# Patient Record
Sex: FEMALE | Race: BLACK OR AFRICAN AMERICAN | Employment: FULL TIME | URBAN - METROPOLITAN AREA
[De-identification: names, ages, dates, MRNs, and addresses within clinical notes are randomized per-mention and may not be internally consistent; named-entity substitution may affect disease eponyms.]

---

## 2018-12-30 ENCOUNTER — HOSPITAL ENCOUNTER (EMERGENCY)
Age: 28
Discharge: HOME | End: 2018-12-30
Attending: EMERGENCY MEDICINE
Payer: COMMERCIAL

## 2018-12-30 VITALS
HEART RATE: 76 BPM | TEMPERATURE: 98.4 F | RESPIRATION RATE: 18 BRPM | WEIGHT: 127 LBS | HEIGHT: 62 IN | SYSTOLIC BLOOD PRESSURE: 110 MMHG | DIASTOLIC BLOOD PRESSURE: 72 MMHG | BODY MASS INDEX: 23.37 KG/M2 | OXYGEN SATURATION: 99 %

## 2018-12-30 DIAGNOSIS — R10.13 ABDOMINAL PAIN, EPIGASTRIC: Primary | ICD-10-CM

## 2018-12-30 LAB
ANION GAP SERPL CALC-SCNC: 10 MMOL/L
BACTERIA URNS QL MICRO: ABNORMAL /HPF
BASOPHILS # BLD: 0 K/UL (ref 0–0.2)
BASOPHILS NFR BLD: 1 % (ref 0–2)
BUN SERPL-MCNC: 6 MG/DL (ref 6–23)
CALCIUM SERPL-MCNC: 8.4 MG/DL (ref 8.3–10.4)
CASTS URNS QL MICRO: ABNORMAL /LPF
CHLORIDE SERPL-SCNC: 108 MMOL/L (ref 98–107)
CO2 SERPL-SCNC: 22 MMOL/L (ref 21–32)
CREAT SERPL-MCNC: 0.58 MG/DL (ref 0.6–1)
DIFFERENTIAL METHOD BLD: ABNORMAL
EOSINOPHIL # BLD: 0.2 K/UL (ref 0–0.8)
EOSINOPHIL NFR BLD: 4 % (ref 0.5–7.8)
EPI CELLS #/AREA URNS HPF: ABNORMAL /HPF
ERYTHROCYTE [DISTWIDTH] IN BLOOD BY AUTOMATED COUNT: 13.2 % (ref 11.9–14.6)
GLUCOSE SERPL-MCNC: 81 MG/DL (ref 65–100)
HCG SERPL-ACNC: 25 MIU/ML (ref 0–6)
HCG UR QL: NEGATIVE
HCT VFR BLD AUTO: 36.2 % (ref 35.8–46.3)
HGB BLD-MCNC: 11.9 G/DL (ref 11.7–15.4)
IMM GRANULOCYTES # BLD: 0 K/UL (ref 0–0.5)
IMM GRANULOCYTES NFR BLD AUTO: 0 % (ref 0–5)
LYMPHOCYTES # BLD: 2.2 K/UL (ref 0.5–4.6)
LYMPHOCYTES NFR BLD: 41 % (ref 13–44)
MCH RBC QN AUTO: 26 PG (ref 26.1–32.9)
MCHC RBC AUTO-ENTMCNC: 32.9 G/DL (ref 31.4–35)
MCV RBC AUTO: 79.2 FL (ref 79.6–97.8)
MONOCYTES # BLD: 0.4 K/UL (ref 0.1–1.3)
MONOCYTES NFR BLD: 8 % (ref 4–12)
NEUTS SEG # BLD: 2.4 K/UL (ref 1.7–8.2)
NEUTS SEG NFR BLD: 46 % (ref 43–78)
NRBC # BLD: 0 K/UL (ref 0–0.2)
PLATELET # BLD AUTO: 332 K/UL (ref 150–450)
PMV BLD AUTO: 8.8 FL (ref 9.4–12.3)
POTASSIUM SERPL-SCNC: 3.4 MMOL/L (ref 3.5–5.1)
RBC # BLD AUTO: 4.57 M/UL (ref 4.05–5.2)
RBC #/AREA URNS HPF: ABNORMAL /HPF
SODIUM SERPL-SCNC: 140 MMOL/L (ref 136–145)
WBC # BLD AUTO: 5.3 K/UL (ref 4.3–11.1)
WBC URNS QL MICRO: ABNORMAL /HPF

## 2018-12-30 PROCEDURE — 96375 TX/PRO/DX INJ NEW DRUG ADDON: CPT | Performed by: EMERGENCY MEDICINE

## 2018-12-30 PROCEDURE — 81015 MICROSCOPIC EXAM OF URINE: CPT

## 2018-12-30 PROCEDURE — 80048 BASIC METABOLIC PNL TOTAL CA: CPT

## 2018-12-30 PROCEDURE — 96374 THER/PROPH/DIAG INJ IV PUSH: CPT | Performed by: EMERGENCY MEDICINE

## 2018-12-30 PROCEDURE — 81003 URINALYSIS AUTO W/O SCOPE: CPT | Performed by: EMERGENCY MEDICINE

## 2018-12-30 PROCEDURE — 99284 EMERGENCY DEPT VISIT MOD MDM: CPT | Performed by: EMERGENCY MEDICINE

## 2018-12-30 PROCEDURE — 85025 COMPLETE CBC W/AUTO DIFF WBC: CPT

## 2018-12-30 PROCEDURE — 74011250636 HC RX REV CODE- 250/636: Performed by: EMERGENCY MEDICINE

## 2018-12-30 PROCEDURE — 81025 URINE PREGNANCY TEST: CPT

## 2018-12-30 PROCEDURE — 84702 CHORIONIC GONADOTROPIN TEST: CPT

## 2018-12-30 RX ORDER — KETOROLAC TROMETHAMINE 30 MG/ML
30 INJECTION, SOLUTION INTRAMUSCULAR; INTRAVENOUS
Status: COMPLETED | OUTPATIENT
Start: 2018-12-30 | End: 2018-12-30

## 2018-12-30 RX ORDER — FAMOTIDINE 10 MG/ML
20 INJECTION INTRAVENOUS
Status: COMPLETED | OUTPATIENT
Start: 2018-12-30 | End: 2018-12-30

## 2018-12-30 RX ADMIN — FAMOTIDINE 20 MG: 10 INJECTION, SOLUTION INTRAVENOUS at 03:28

## 2018-12-30 RX ADMIN — KETOROLAC TROMETHAMINE 30 MG: 30 INJECTION, SOLUTION INTRAMUSCULAR at 03:28

## 2018-12-30 NOTE — ED TRIAGE NOTES
Pt states that she had an ectopic pregnancy 2 weeks ago and was given methotrexate. Continues to have right lower abd pain and vaginal bleeding

## 2018-12-30 NOTE — ED PROVIDER NOTES
81-year-old female presented for epigastric cramping. Symptoms are just prior to arrival.  It got much better now. She thinks it may be related to having a few drinks tonight when she was out. She is concerned that because she had an ectopic pregnancy treated with methotrexate. She's been getting weekly blood test to make sure her hCG is dropping. most recent was 225. She reports some nausea but denies vomiting The history is provided by the patient. Abdominal Pain This is a new problem. The current episode started 1 to 2 hours ago. The problem occurs constantly. The problem has been gradually improving. The pain is associated with ETOH use. The pain is located in the epigastric region. The quality of the pain is aching and cramping. The pain is at a severity of 4/10. The pain is moderate. Associated symptoms include nausea. Pertinent negatives include no anorexia, no fever, no belching, no diarrhea, no flatus, no hematochezia, no melena, no vomiting, no constipation, no dysuria, no frequency, no hematuria, no headaches, no arthralgias, no myalgias, no trauma, no chest pain, no testicular pain and no back pain. No past medical history on file. Past Surgical History:  
Procedure Laterality Date  HX CHOLECYSTECTOMY Family History:  
Problem Relation Age of Onset  Breast Cancer Mother   
     s/p breast reduction- incidental finding, unsure details, mom is secretive  Breast Cancer Other 23 P 1/2 sister in the [facility], Bilateral mastectomy, chemo and radiation-unsure if she had BRACA testing.  Stomach Cancer Other ? PGM  
 Ovarian Cancer Neg Hx  Colon Cancer Neg Hx  Prostate Cancer Neg Hx  Deep Vein Thrombosis Neg Hx  Pulmonary Embolism Neg Hx Social History Socioeconomic History  Marital status: SINGLE Spouse name: Not on file  Number of children: Not on file  Years of education: Not on file  Highest education level: Not on file Social Needs  Financial resource strain: Not on file  Food insecurity - worry: Not on file  Food insecurity - inability: Not on file  Transportation needs - medical: Not on file  Transportation needs - non-medical: Not on file Occupational History  Not on file Tobacco Use  Smoking status: Never Smoker  Smokeless tobacco: Never Used Substance and Sexual Activity  Alcohol use: No  
 Drug use: No  
 Sexual activity: Yes  
  Partners: Male Birth control/protection: Condom Other Topics Concern  Not on file Social History Narrative 1. Sister is Bettina Mccollum ALLERGIES: Bactrim [sulfamethoprim ds] and Sulfa (sulfonamide antibiotics) Review of Systems Constitutional: Negative for fever. Cardiovascular: Negative for chest pain. Gastrointestinal: Positive for abdominal pain and nausea. Negative for anorexia, constipation, diarrhea, flatus, hematochezia, melena and vomiting. Genitourinary: Negative for dysuria, frequency, hematuria and testicular pain. Musculoskeletal: Negative for arthralgias, back pain and myalgias. Neurological: Negative for headaches. All other systems reviewed and are negative. Vitals:  
 12/30/18 0148 BP: 106/68 Pulse: 86 Resp: 16 Temp: 98.2 °F (36.8 °C) SpO2: 98% Weight: 57.6 kg (127 lb) Height: 5' 2\" (1.575 m) Physical Exam  
Constitutional: She is oriented to person, place, and time. She appears well-developed and well-nourished. HENT:  
Head: Normocephalic and atraumatic. Eyes: Conjunctivae are normal. Pupils are equal, round, and reactive to light. Neck: Neck supple. Cardiovascular: Normal rate and regular rhythm. Pulmonary/Chest: Effort normal and breath sounds normal.  
Abdominal: Soft. Bowel sounds are normal.  
Musculoskeletal: Normal range of motion. Neurological: She is alert and oriented to person, place, and time. Skin: Skin is warm and dry. Nursing note and vitals reviewed. MDM Number of Diagnoses or Management Options Abdominal pain, epigastric:  
Diagnosis management comments: 81-year-old female presenting for epigastric pain. Concern for constipation, peptic culture disease, pancreatitis, cholelithiasis. Patient did recently have methotrexate treatment for an ectopic pregnancy but her pain is more in the epigastric area one would think pain related spelled B in the lower abdomen. We'll check basic labs, hCG and treat with Pepcid and Toradol. Amount and/or Complexity of Data Reviewed Clinical lab tests: ordered and reviewed Tests in the radiology section of CPT®: ordered and reviewed Tests in the medicine section of CPT®: ordered and reviewed Risk of Complications, Morbidity, and/or Mortality Presenting problems: moderate Diagnostic procedures: moderate Management options: moderate General comments: Patient is feeling much better. Abdominal exam is benign. Vital signs are normal.  Patient's hCG is dropping so do not think this has any relationship to her recent ectopic pregnancy. Patient's symptoms seemed to have resolved. Discharging home with instructions to return should her symptoms change or worsen Patient Progress Patient progress: improved Procedures

## 2018-12-30 NOTE — ED NOTES
Found pt in waiting room eating goldfish crackers.  Informed pt not to eat or drink anything else until approved by the MD.

## 2018-12-30 NOTE — ED NOTES
I have reviewed discharge instructions with the patient. The patient verbalized understanding. Patient left ED via Discharge Method: ambulatory to Home with (friend). Opportunity for questions and clarification provided. Patient given 0 scripts. To continue your aftercare when you leave the hospital, you may receive an automated call from our care team to check in on how you are doing. This is a free service and part of our promise to provide the best care and service to meet your aftercare needs.  If you have questions, or wish to unsubscribe from this service please call [phone]. Thank you for Choosing our [facility] Emergency Department.

## 2018-12-30 NOTE — DISCHARGE INSTRUCTIONS
Please return the emergency Department if he develop worsening pain, fevers, or nearly passed out.   Otherwise please follow up with your doctors next week or 2 for reevaluation

## 2021-08-31 PROBLEM — R42 DIZZINESS: Status: ACTIVE | Noted: 2021-08-31

## 2021-08-31 PROBLEM — M79.645 PAIN OF LEFT THUMB: Status: ACTIVE | Noted: 2021-08-31

## 2022-02-08 PROBLEM — Z34.90 SUPERVISION OF NORMAL PREGNANCY: Status: ACTIVE | Noted: 2022-02-08

## 2022-03-18 PROBLEM — Z34.90 SUPERVISION OF NORMAL PREGNANCY: Status: ACTIVE | Noted: 2022-02-08

## 2022-03-19 PROBLEM — R42 DIZZINESS: Status: ACTIVE | Noted: 2021-08-31

## 2022-03-19 PROBLEM — M79.645 PAIN OF LEFT THUMB: Status: ACTIVE | Noted: 2021-08-31

## 2022-05-19 VITALS — DIASTOLIC BLOOD PRESSURE: 60 MMHG | WEIGHT: 159 LBS | BODY MASS INDEX: 29.08 KG/M2 | SYSTOLIC BLOOD PRESSURE: 102 MMHG

## 2022-06-09 ENCOUNTER — ROUTINE PRENATAL (OUTPATIENT)
Dept: OBGYN CLINIC | Age: 32
End: 2022-06-09
Payer: MEDICAID

## 2022-06-09 VITALS — BODY MASS INDEX: 29.81 KG/M2 | SYSTOLIC BLOOD PRESSURE: 102 MMHG | WEIGHT: 163 LBS | DIASTOLIC BLOOD PRESSURE: 60 MMHG

## 2022-06-09 DIAGNOSIS — Z13.0 SCREENING, ANEMIA, DEFICIENCY, IRON: ICD-10-CM

## 2022-06-09 DIAGNOSIS — Z34.83 ENCOUNTER FOR SUPERVISION OF OTHER NORMAL PREGNANCY IN THIRD TRIMESTER: Primary | ICD-10-CM

## 2022-06-09 DIAGNOSIS — Z13.1 SCREENING FOR DIABETES MELLITUS: ICD-10-CM

## 2022-06-09 PROCEDURE — 99213 OFFICE O/P EST LOW 20 MIN: CPT | Performed by: OBSTETRICS & GYNECOLOGY

## 2022-06-09 NOTE — PROGRESS NOTES
26yo  at 29w3d for CHEL s/p 1hr GTT.    +FM, no VB or LOF    RC discussed. Greater than 15 minutes spent on face to face counseling, education, and examining the patient regarding her exam findings, indications for further tests, and discussion of routine prenatal counseling and COVID updates, recommendation for TDap vaccination, importance of moderation with carbs regardless of glucola results, explanation of glucola results significance and potential clinical implications should she screen positive, discussion of expected fetal movement pattern in 3rd trimester, safety if traveling in 3rd trimester, and discussion of importance of adequate hydration during these very high temperatures. RTC 2wks for CHEL    See PN flowsheet for vitals and other clinical information as necessary.

## 2022-06-10 LAB
BASOPHILS # BLD: 0 K/UL (ref 0–0.2)
BASOPHILS NFR BLD: 0 % (ref 0–2)
DIFFERENTIAL METHOD BLD: ABNORMAL
EOSINOPHIL # BLD: 0.2 K/UL (ref 0–0.8)
EOSINOPHIL NFR BLD: 3 % (ref 0.5–7.8)
ERYTHROCYTE [DISTWIDTH] IN BLOOD BY AUTOMATED COUNT: 14 % (ref 11.9–14.6)
GLUCOSE 1 HOUR: 153 MG/DL
HCT VFR BLD AUTO: 32.9 % (ref 35.8–46.3)
HGB BLD-MCNC: 10.8 G/DL (ref 11.7–15.4)
IMM GRANULOCYTES # BLD AUTO: 0.1 K/UL (ref 0–0.5)
IMM GRANULOCYTES NFR BLD AUTO: 1 % (ref 0–5)
LYMPHOCYTES # BLD: 1.4 K/UL (ref 0.5–4.6)
LYMPHOCYTES NFR BLD: 18 % (ref 13–44)
MCH RBC QN AUTO: 26.2 PG (ref 26.1–32.9)
MCHC RBC AUTO-ENTMCNC: 32.8 G/DL (ref 31.4–35)
MCV RBC AUTO: 79.7 FL (ref 79.6–97.8)
MONOCYTES # BLD: 0.5 K/UL (ref 0.1–1.3)
MONOCYTES NFR BLD: 6 % (ref 4–12)
NEUTS SEG # BLD: 5.3 K/UL (ref 1.7–8.2)
NEUTS SEG NFR BLD: 72 % (ref 43–78)
NRBC # BLD: 0 K/UL (ref 0–0.2)
PLATELET # BLD AUTO: 321 K/UL (ref 150–450)
PMV BLD AUTO: 10.6 FL (ref 9.4–12.3)
RBC # BLD AUTO: 4.13 M/UL (ref 4.05–5.2)
WBC # BLD AUTO: 7.5 K/UL (ref 4.3–11.1)

## 2022-06-14 ENCOUNTER — TELEPHONE (OUTPATIENT)
Dept: OBGYN CLINIC | Age: 32
End: 2022-06-14

## 2022-06-14 NOTE — TELEPHONE ENCOUNTER
Pt called asking about recent lab results. Labs have not been reviewed by Dr. David Guerin yet, so pt instructed to await any follow up comments on results. Pt was advised that HGB is 10. 8. pt reports feeling short of breath since yesterday, denies any other symptoms. Pt instructed to go to [address] [place] [address] ER for evaluation of shortness of breath. Address of [facility] given to pt. Pt verbalized understanding and has no further questions at this time.

## 2022-06-21 ENCOUNTER — ROUTINE PRENATAL (OUTPATIENT)
Dept: OBGYN CLINIC | Age: 32
End: 2022-06-21
Payer: MEDICAID

## 2022-06-21 VITALS — DIASTOLIC BLOOD PRESSURE: 60 MMHG | SYSTOLIC BLOOD PRESSURE: 120 MMHG | BODY MASS INDEX: 30 KG/M2 | WEIGHT: 164 LBS

## 2022-06-21 DIAGNOSIS — Z34.83 ENCOUNTER FOR SUPERVISION OF OTHER NORMAL PREGNANCY IN THIRD TRIMESTER: Primary | ICD-10-CM

## 2022-06-21 PROCEDURE — 99213 OFFICE O/P EST LOW 20 MIN: CPT | Performed by: OBSTETRICS & GYNECOLOGY

## 2022-06-21 NOTE — PROGRESS NOTES
26yo  at 31w1d for CHEL:    +FM, no LOF, no VB  Vitals:    22 1124   BP: 120/60       FH:  +++FHTs    Failed 1hr GTT, needs to complete 3hr GTT asap or start checking sugars. Rh pos / hgb 10.8 / Hct 32.9 / plts 321    Needs to start iron supp. GBS pos urine. RTC 2wks. See PN flowsheet for pertinent information. Greater than 20 minutes spent on same day of service in reviewing her prenatal record, problem list, MFM notes if applicable and in face to face counseling, education, and examining the patient regarding her exam findings, indications for further tests, and discussion of assessment and plan as stated above.

## 2022-06-30 RX ORDER — GLUCOSAMINE HCL/CHONDROITIN SU 500-400 MG
CAPSULE ORAL
Qty: 120 STRIP | Refills: 1 | Status: SHIPPED | OUTPATIENT
Start: 2022-06-30 | End: 2022-08-04 | Stop reason: SDUPTHER

## 2022-06-30 NOTE — TELEPHONE ENCOUNTER
Sravani Duran MD 18 minutes ago (11:05 AM)     [place]      Good morning,      I am out of the True Matrix test strips and needed a refill to check my blood sugar.

## 2022-07-05 ENCOUNTER — ROUTINE PRENATAL (OUTPATIENT)
Dept: OBGYN CLINIC | Age: 32
End: 2022-07-05
Payer: MEDICAID

## 2022-07-05 VITALS — DIASTOLIC BLOOD PRESSURE: 60 MMHG | BODY MASS INDEX: 29.63 KG/M2 | WEIGHT: 162 LBS | SYSTOLIC BLOOD PRESSURE: 102 MMHG

## 2022-07-05 DIAGNOSIS — O99.810 ABNORMAL GLUCOSE TOLERANCE TEST IN PREGNANCY: Primary | ICD-10-CM

## 2022-07-05 PROCEDURE — 99213 OFFICE O/P EST LOW 20 MIN: CPT | Performed by: OBSTETRICS & GYNECOLOGY

## 2022-07-05 PROCEDURE — 76816 OB US FOLLOW-UP PER FETUS: CPT | Performed by: OBSTETRICS & GYNECOLOGY

## 2022-07-05 NOTE — PROGRESS NOTES
33yo  at 33w1d for CHEL:    +FM, no VB or LOF    Vitals:    22 1147   BP: 102/60     Growth US today:  EFW 41%ile (2078g, 4#9)  AC 53%KMS  ALVARO 13  Cephalic  Posterior placenta  BPP     Pt checking sugars - discussed. A1DM for now. RTC 2wks, send sugars by iPling q week. Call for anything over 200. Greater than 20 minutes spent on same day of service in reviewing her prenatal record, problem list, MFM notes if applicable and in face to face counseling, education, and examining the patient regarding her exam findings, indications for further tests, and discussion of assessment and plan as stated above.

## 2022-07-12 RX ORDER — LANCETS 28 GAUGE
1 EACH MISCELLANEOUS DAILY
Qty: 120 EACH | Refills: 3 | Status: SHIPPED | OUTPATIENT
Start: 2022-07-12 | End: 2022-08-04 | Stop reason: SDUPTHER

## 2022-07-12 NOTE — TELEPHONE ENCOUNTER
Lancets sent to [facility].          Varsha Gaytan MD 43 minutes ago (9:17 AM)     [place]      Good morning,       I am out of the ultra thin lancets 30gauge (100 lancets) and need a refill. Also, can you send them to [facility] pharmacy on [address]?

## 2022-07-25 ENCOUNTER — ROUTINE PRENATAL (OUTPATIENT)
Dept: OBGYN CLINIC | Age: 32
End: 2022-07-25
Payer: MEDICAID

## 2022-07-25 VITALS — SYSTOLIC BLOOD PRESSURE: 108 MMHG | WEIGHT: 164 LBS | BODY MASS INDEX: 30 KG/M2 | DIASTOLIC BLOOD PRESSURE: 58 MMHG

## 2022-07-25 DIAGNOSIS — O24.410 DIET CONTROLLED GESTATIONAL DIABETES MELLITUS (GDM) IN THIRD TRIMESTER: ICD-10-CM

## 2022-07-25 DIAGNOSIS — Z36.85 SCREENING, ANTENATAL, FOR STREPTOCOCCUS B: ICD-10-CM

## 2022-07-25 DIAGNOSIS — Z34.83 ENCOUNTER FOR SUPERVISION OF OTHER NORMAL PREGNANCY IN THIRD TRIMESTER: Primary | ICD-10-CM

## 2022-07-25 PROCEDURE — 99213 OFFICE O/P EST LOW 20 MIN: CPT | Performed by: OBSTETRICS & GYNECOLOGY

## 2022-07-25 NOTE — PROGRESS NOTES
31yo  @ 36wks for CHEL:    +FM, no VB, LOF or Ctx. C/o pain under her ribcage. Vitals:    22 1519   BP: (!) 108/58     +++FHTs  Fetus palpates cephalic    GBS collected    1. Encounter for supervision of other normal pregnancy in third trimester    2. Diet controlled gestational diabetes mellitus (GDM) in third trimester    3. Screening, , for Streptococcus B      Orders Placed This Encounter   Procedures    Culture, Strep B Screen, Vaginal/Rectal       Discussed her pain complaint. No sx of preE & BP WNL. Discussed sugars, reviewed RC and BRITNEY & LP. RTC 1wk    Greater than 20 minutes spent on same day of service in reviewing her prenatal record, problem list, MFM notes if applicable and in face to face counseling, education, and examining the patient regarding her exam findings, indications for further tests, and discussion of assessment and plan as stated above.

## 2022-07-30 LAB
BACTERIA SPEC CULT: ABNORMAL
BACTERIA SPEC CULT: ABNORMAL
SERVICE CMNT-IMP: ABNORMAL

## 2022-08-04 ENCOUNTER — ROUTINE PRENATAL (OUTPATIENT)
Dept: OBGYN CLINIC | Age: 32
End: 2022-08-04
Payer: MEDICAID

## 2022-08-04 VITALS — BODY MASS INDEX: 29.81 KG/M2 | WEIGHT: 163 LBS | SYSTOLIC BLOOD PRESSURE: 106 MMHG | DIASTOLIC BLOOD PRESSURE: 70 MMHG

## 2022-08-04 DIAGNOSIS — B00.9 HERPES SIMPLEX VIRUS TYPE 2 (HSV-2) INFECTION AFFECTING PREGNANCY IN THIRD TRIMESTER, ANTEPARTUM: ICD-10-CM

## 2022-08-04 DIAGNOSIS — O24.410 DIET CONTROLLED GESTATIONAL DIABETES MELLITUS (GDM) IN THIRD TRIMESTER: Primary | ICD-10-CM

## 2022-08-04 DIAGNOSIS — O98.513 HERPES SIMPLEX VIRUS TYPE 2 (HSV-2) INFECTION AFFECTING PREGNANCY IN THIRD TRIMESTER, ANTEPARTUM: ICD-10-CM

## 2022-08-04 DIAGNOSIS — Z34.83 ENCOUNTER FOR SUPERVISION OF OTHER NORMAL PREGNANCY IN THIRD TRIMESTER: ICD-10-CM

## 2022-08-04 PROCEDURE — 99213 OFFICE O/P EST LOW 20 MIN: CPT | Performed by: OBSTETRICS & GYNECOLOGY

## 2022-08-04 RX ORDER — VALACYCLOVIR HYDROCHLORIDE 500 MG/1
1000 TABLET, FILM COATED ORAL DAILY
Qty: 60 TABLET | Refills: 1 | Status: ON HOLD | OUTPATIENT
Start: 2022-08-04 | End: 2022-08-24 | Stop reason: HOSPADM

## 2022-08-04 RX ORDER — LANCETS 28 GAUGE
1 EACH MISCELLANEOUS DAILY
Qty: 120 EACH | Refills: 3 | Status: SHIPPED | OUTPATIENT
Start: 2022-08-04 | End: 2022-10-25

## 2022-08-04 RX ORDER — GLUCOSAMINE HCL/CHONDROITIN SU 500-400 MG
CAPSULE ORAL
Qty: 120 STRIP | Refills: 1 | Status: ON HOLD | OUTPATIENT
Start: 2022-08-04 | End: 2022-08-24 | Stop reason: HOSPADM

## 2022-08-04 RX ORDER — LANCETS 28 GAUGE
1 EACH MISCELLANEOUS DAILY
Qty: 120 EACH | Refills: 3 | Status: SHIPPED | OUTPATIENT
Start: 2022-08-04 | End: 2022-08-04 | Stop reason: SDUPTHER

## 2022-08-04 RX ORDER — GLUCOSAMINE HCL/CHONDROITIN SU 500-400 MG
CAPSULE ORAL
Qty: 120 STRIP | Refills: 1 | Status: SHIPPED | OUTPATIENT
Start: 2022-08-04 | End: 2022-08-04 | Stop reason: SDUPTHER

## 2022-08-04 NOTE — PROGRESS NOTES
31yo  at 37w3d for CHEL:    +FM, no VB or LOF or ctx. Vitals:    22 1302   BP: 106/70     +++FHTs  Fetus palpated cephalic. No diagnosis found. Orders Placed This Encounter   Medications    DISCONTD: FreeStyle Lancets MISC     Si each by Does not apply route daily     Dispense:  120 each     Refill:  3     Ultra Thin lancets    DISCONTD: blood glucose monitor strips     Sig: Test 4 times a day & as needed for symptoms of irregular blood glucose. Dispense sufficient amount for indicated testing frequency plus additional to accommodate PRN testing needs. Dispense:  120 strip     Refill:  1     Pt requests True Matrix    blood glucose monitor strips     Sig: Test 4 times a day & as needed for symptoms of irregular blood glucose. Dispense sufficient amount for indicated testing frequency plus additional to accommodate PRN testing needs. Dispense:  120 strip     Refill:  1     Pt requests True Matrix    FreeStyle Lancets MISC     Si each by Does not apply route daily     Dispense:  120 each     Refill:  3     Ultra Thin lancets       See BS scanned in from 22, overall ok with scant elevations related to dietary choices. Pt reports a hx of having blood work +for HSV Abs in the remote past and although does not have a hx of outbreaks, pt wondered if she should be on medication  - discussed that I will send in valtrex for her and reviewed the need to prevent outbreaks near delivery (ideally 2 wks) thus start asap. Greater than 20 minutes spent on same day of service in reviewing her prenatal record, problem list, MFM notes if applicable and in face to face counseling, education, and examining the patient regarding her exam findings, indications for further tests, and discussion of assessment and plan as stated above.

## 2022-08-05 ENCOUNTER — TELEPHONE (OUTPATIENT)
Dept: OBGYN CLINIC | Age: 32
End: 2022-08-05

## 2022-08-05 NOTE — TELEPHONE ENCOUNTER
Called pt to get appointment next week r/s as it was not scheduled with an ultrasound. Pt did not answer, and VM is not set up. Sent a [employer] message asking pt could she come in on 8/9/22. Waiting on response but appointment is on the schedule.

## 2022-08-09 ENCOUNTER — ROUTINE PRENATAL (OUTPATIENT)
Dept: OBGYN CLINIC | Age: 32
End: 2022-08-09
Payer: MEDICAID

## 2022-08-09 VITALS — BODY MASS INDEX: 29.63 KG/M2 | WEIGHT: 162 LBS | DIASTOLIC BLOOD PRESSURE: 78 MMHG | SYSTOLIC BLOOD PRESSURE: 120 MMHG

## 2022-08-09 DIAGNOSIS — B00.9 HERPES SIMPLEX VIRUS TYPE 2 (HSV-2) INFECTION AFFECTING PREGNANCY IN THIRD TRIMESTER, ANTEPARTUM: ICD-10-CM

## 2022-08-09 DIAGNOSIS — O24.410 DIET CONTROLLED GESTATIONAL DIABETES MELLITUS (GDM) IN THIRD TRIMESTER: Primary | ICD-10-CM

## 2022-08-09 DIAGNOSIS — O98.513 HERPES SIMPLEX VIRUS TYPE 2 (HSV-2) INFECTION AFFECTING PREGNANCY IN THIRD TRIMESTER, ANTEPARTUM: ICD-10-CM

## 2022-08-09 PROCEDURE — 99213 OFFICE O/P EST LOW 20 MIN: CPT | Performed by: OBSTETRICS & GYNECOLOGY

## 2022-08-09 PROCEDURE — 76816 OB US FOLLOW-UP PER FETUS: CPT | Performed by: OBSTETRICS & GYNECOLOGY

## 2022-08-15 ENCOUNTER — ROUTINE PRENATAL (OUTPATIENT)
Dept: OBGYN CLINIC | Age: 32
End: 2022-08-15
Payer: MEDICAID

## 2022-08-15 VITALS — BODY MASS INDEX: 30 KG/M2 | WEIGHT: 164 LBS | SYSTOLIC BLOOD PRESSURE: 114 MMHG | DIASTOLIC BLOOD PRESSURE: 78 MMHG

## 2022-08-15 DIAGNOSIS — O98.513 HERPES SIMPLEX VIRUS TYPE 2 (HSV-2) INFECTION AFFECTING PREGNANCY IN THIRD TRIMESTER, ANTEPARTUM: ICD-10-CM

## 2022-08-15 DIAGNOSIS — B00.9 HERPES SIMPLEX VIRUS TYPE 2 (HSV-2) INFECTION AFFECTING PREGNANCY IN THIRD TRIMESTER, ANTEPARTUM: ICD-10-CM

## 2022-08-15 DIAGNOSIS — O24.410 DIET CONTROLLED GESTATIONAL DIABETES MELLITUS (GDM) IN THIRD TRIMESTER: Primary | ICD-10-CM

## 2022-08-15 DIAGNOSIS — Z34.83 ENCOUNTER FOR SUPERVISION OF OTHER NORMAL PREGNANCY IN THIRD TRIMESTER: ICD-10-CM

## 2022-08-15 PROCEDURE — 99213 OFFICE O/P EST LOW 20 MIN: CPT | Performed by: OBSTETRICS & GYNECOLOGY

## 2022-08-15 NOTE — PROGRESS NOTES
28yo  at 39w0d for CHEL:    +HSV outbreak w/ onset of symptoms between  & , has not had an outbreak in years and years or maybe at all, hard to say bc she was diagnosed w/ bloodwork only. Pt brought this up at her appt on the  and we started her on valtrex that day. Pt then had some irritation over the weekend and was not able to see me until  at which time she did have an outbreak. Lesions have resolved on exam and pt is trying to buy time until labor in order to have a vaginal delivery. She is taking daily valtrex. Discussed if labors this week, may need a , but would encourage her to have them call MFM on call to discuss exact length of time since trying to wait for spontaneous labor and not induce. Kick counts discussed. See PN flowsheet for pertinent details, etc.    Greater than 20 minutes spent on same day of service in reviewing her prenatal record, problem list, MFM notes if applicable and in face to face counseling, education, and examining the patient regarding her exam findings, indications for further tests, and discussion of assessment and plan as stated above.

## 2022-08-22 ENCOUNTER — ANESTHESIA EVENT (OUTPATIENT)
Dept: LABOR AND DELIVERY | Age: 32
End: 2022-08-22
Payer: MEDICAID

## 2022-08-22 ENCOUNTER — ANESTHESIA (OUTPATIENT)
Dept: LABOR AND DELIVERY | Age: 32
End: 2022-08-22
Payer: MEDICAID

## 2022-08-22 ENCOUNTER — HOSPITAL ENCOUNTER (INPATIENT)
Age: 32
LOS: 2 days | Discharge: HOME | End: 2022-08-24
Attending: OBSTETRICS & GYNECOLOGY | Admitting: OBSTETRICS & GYNECOLOGY
Payer: MEDICAID

## 2022-08-22 ENCOUNTER — ROUTINE PRENATAL (OUTPATIENT)
Dept: OBGYN CLINIC | Age: 32
End: 2022-08-22

## 2022-08-22 VITALS — WEIGHT: 162 LBS | DIASTOLIC BLOOD PRESSURE: 66 MMHG | SYSTOLIC BLOOD PRESSURE: 104 MMHG | BODY MASS INDEX: 29.63 KG/M2

## 2022-08-22 DIAGNOSIS — Z34.83 ENCOUNTER FOR SUPERVISION OF OTHER NORMAL PREGNANCY IN THIRD TRIMESTER: Primary | ICD-10-CM

## 2022-08-22 DIAGNOSIS — Z37.9 NORMAL LABOR: Primary | ICD-10-CM

## 2022-08-22 PROBLEM — M79.645 PAIN OF LEFT THUMB: Status: RESOLVED | Noted: 2021-08-31 | Resolved: 2022-08-22

## 2022-08-22 PROBLEM — Z34.90 SUPERVISION OF NORMAL PREGNANCY: Status: ACTIVE | Noted: 2022-02-08

## 2022-08-22 PROBLEM — Z34.90 ENCOUNTER FOR INDUCTION OF LABOR: Status: ACTIVE | Noted: 2022-08-22

## 2022-08-22 LAB
ABO + RH BLD: NORMAL
BASOPHILS # BLD: 0 K/UL (ref 0–0.2)
BASOPHILS NFR BLD: 0 % (ref 0–2)
BLOOD GROUP ANTIBODIES SERPL: NORMAL
DIFFERENTIAL METHOD BLD: ABNORMAL
EOSINOPHIL # BLD: 0.1 K/UL (ref 0–0.8)
EOSINOPHIL NFR BLD: 2 % (ref 0.5–7.8)
ERYTHROCYTE [DISTWIDTH] IN BLOOD BY AUTOMATED COUNT: 16.4 % (ref 11.9–14.6)
HCT VFR BLD AUTO: 37.5 % (ref 35.8–46.3)
HGB BLD-MCNC: 12.7 G/DL (ref 11.7–15.4)
IMM GRANULOCYTES # BLD AUTO: 0 K/UL (ref 0–0.5)
IMM GRANULOCYTES NFR BLD AUTO: 0 % (ref 0–5)
LYMPHOCYTES # BLD: 1.8 K/UL (ref 0.5–4.6)
LYMPHOCYTES NFR BLD: 25 % (ref 13–44)
MCH RBC QN AUTO: 26.2 PG (ref 26.1–32.9)
MCHC RBC AUTO-ENTMCNC: 33.9 G/DL (ref 31.4–35)
MCV RBC AUTO: 77.3 FL (ref 79.6–97.8)
MONOCYTES # BLD: 0.6 K/UL (ref 0.1–1.3)
MONOCYTES NFR BLD: 8 % (ref 4–12)
NEUTS SEG # BLD: 4.6 K/UL (ref 1.7–8.2)
NEUTS SEG NFR BLD: 65 % (ref 43–78)
NRBC # BLD: 0 K/UL (ref 0–0.2)
PLATELET # BLD AUTO: 298 K/UL (ref 150–450)
PMV BLD AUTO: 10 FL (ref 9.4–12.3)
RBC # BLD AUTO: 4.85 M/UL (ref 4.05–5.2)
SPECIMEN EXP DATE BLD: NORMAL
WBC # BLD AUTO: 7.1 K/UL (ref 4.3–11.1)

## 2022-08-22 PROCEDURE — 7100000010 HC PHASE II RECOVERY - FIRST 15 MIN

## 2022-08-22 PROCEDURE — 3E033VJ INTRODUCTION OF OTHER HORMONE INTO PERIPHERAL VEIN, PERCUTANEOUS APPROACH: ICD-10-PCS | Performed by: OBSTETRICS & GYNECOLOGY

## 2022-08-22 PROCEDURE — 2580000003 HC RX 258: Performed by: OBSTETRICS & GYNECOLOGY

## 2022-08-22 PROCEDURE — 7220000101 HC DELIVERY VAGINAL/SINGLE

## 2022-08-22 PROCEDURE — 7100000011 HC PHASE II RECOVERY - ADDTL 15 MIN

## 2022-08-22 PROCEDURE — 36415 COLL VENOUS BLD VENIPUNCTURE: CPT

## 2022-08-22 PROCEDURE — 86901 BLOOD TYPING SEROLOGIC RH(D): CPT

## 2022-08-22 PROCEDURE — 6370000000 HC RX 637 (ALT 250 FOR IP): Performed by: OBSTETRICS & GYNECOLOGY

## 2022-08-22 PROCEDURE — 85025 COMPLETE CBC W/AUTO DIFF WBC: CPT

## 2022-08-22 PROCEDURE — 6360000002 HC RX W HCPCS: Performed by: OBSTETRICS & GYNECOLOGY

## 2022-08-22 PROCEDURE — 7210000100 HC LABOR FEE PER 1 HR

## 2022-08-22 PROCEDURE — 59409 OBSTETRICAL CARE: CPT | Performed by: OBSTETRICS & GYNECOLOGY

## 2022-08-22 PROCEDURE — 6360000002 HC RX W HCPCS: Performed by: STUDENT IN AN ORGANIZED HEALTH CARE EDUCATION/TRAINING PROGRAM

## 2022-08-22 PROCEDURE — 99902 PR PRENATAL VISIT: CPT | Performed by: OBSTETRICS & GYNECOLOGY

## 2022-08-22 PROCEDURE — 4A1HXCZ MONITORING OF PRODUCTS OF CONCEPTION, CARDIAC RATE, EXTERNAL APPROACH: ICD-10-PCS | Performed by: OBSTETRICS & GYNECOLOGY

## 2022-08-22 PROCEDURE — 1100000000 HC RM PRIVATE

## 2022-08-22 PROCEDURE — 3700000025 EPIDURAL BLOCK: Performed by: ANESTHESIOLOGY

## 2022-08-22 RX ORDER — CARBOPROST TROMETHAMINE 250 UG/ML
250 INJECTION, SOLUTION INTRAMUSCULAR
Status: DISCONTINUED | OUTPATIENT
Start: 2022-08-22 | End: 2022-08-22

## 2022-08-22 RX ORDER — FAMOTIDINE 20 MG/1
20 TABLET, FILM COATED ORAL 2 TIMES DAILY PRN
Status: DISCONTINUED | OUTPATIENT
Start: 2022-08-22 | End: 2022-08-24 | Stop reason: HOSPADM

## 2022-08-22 RX ORDER — SODIUM CHLORIDE 0.9 % (FLUSH) 0.9 %
5-40 SYRINGE (ML) INJECTION EVERY 12 HOURS SCHEDULED
Status: DISCONTINUED | OUTPATIENT
Start: 2022-08-22 | End: 2022-08-24 | Stop reason: HOSPADM

## 2022-08-22 RX ORDER — SIMETHICONE 80 MG
80 TABLET,CHEWABLE ORAL EVERY 6 HOURS PRN
Status: DISCONTINUED | OUTPATIENT
Start: 2022-08-22 | End: 2022-08-24 | Stop reason: HOSPADM

## 2022-08-22 RX ORDER — ONDANSETRON 2 MG/ML
4 INJECTION INTRAMUSCULAR; INTRAVENOUS EVERY 6 HOURS PRN
Status: DISCONTINUED | OUTPATIENT
Start: 2022-08-22 | End: 2022-08-22

## 2022-08-22 RX ORDER — LIDOCAINE HYDROCHLORIDE 10 MG/ML
1 INJECTION, SOLUTION INFILTRATION; PERINEURAL
Status: DISCONTINUED | OUTPATIENT
Start: 2022-08-22 | End: 2022-08-22 | Stop reason: HOSPADM

## 2022-08-22 RX ORDER — SODIUM CHLORIDE 9 MG/ML
INJECTION, SOLUTION INTRAVENOUS PRN
Status: DISCONTINUED | OUTPATIENT
Start: 2022-08-22 | End: 2022-08-22 | Stop reason: HOSPADM

## 2022-08-22 RX ORDER — SODIUM CHLORIDE, SODIUM LACTATE, POTASSIUM CHLORIDE, AND CALCIUM CHLORIDE .6; .31; .03; .02 G/100ML; G/100ML; G/100ML; G/100ML
500 INJECTION, SOLUTION INTRAVENOUS PRN
Status: DISCONTINUED | OUTPATIENT
Start: 2022-08-22 | End: 2022-08-22

## 2022-08-22 RX ORDER — SODIUM CHLORIDE, SODIUM LACTATE, POTASSIUM CHLORIDE, AND CALCIUM CHLORIDE .6; .31; .03; .02 G/100ML; G/100ML; G/100ML; G/100ML
1000 INJECTION, SOLUTION INTRAVENOUS PRN
Status: DISCONTINUED | OUTPATIENT
Start: 2022-08-22 | End: 2022-08-22

## 2022-08-22 RX ORDER — SODIUM CHLORIDE 0.9 % (FLUSH) 0.9 %
5-40 SYRINGE (ML) INJECTION PRN
Status: DISCONTINUED | OUTPATIENT
Start: 2022-08-22 | End: 2022-08-22 | Stop reason: HOSPADM

## 2022-08-22 RX ORDER — IBUPROFEN 600 MG/1
600 TABLET ORAL EVERY 6 HOURS
Status: DISCONTINUED | OUTPATIENT
Start: 2022-08-22 | End: 2022-08-24 | Stop reason: HOSPADM

## 2022-08-22 RX ORDER — METHYLERGONOVINE MALEATE 0.2 MG/ML
200 INJECTION INTRAVENOUS
Status: DISCONTINUED | OUTPATIENT
Start: 2022-08-22 | End: 2022-08-22

## 2022-08-22 RX ORDER — OXYCODONE HYDROCHLORIDE 5 MG/1
5 TABLET ORAL EVERY 4 HOURS PRN
Status: DISCONTINUED | OUTPATIENT
Start: 2022-08-22 | End: 2022-08-24 | Stop reason: HOSPADM

## 2022-08-22 RX ORDER — ACETAMINOPHEN 325 MG/1
650 TABLET ORAL EVERY 6 HOURS
Status: DISCONTINUED | OUTPATIENT
Start: 2022-08-22 | End: 2022-08-24 | Stop reason: HOSPADM

## 2022-08-22 RX ORDER — SODIUM CHLORIDE, SODIUM LACTATE, POTASSIUM CHLORIDE, CALCIUM CHLORIDE 600; 310; 30; 20 MG/100ML; MG/100ML; MG/100ML; MG/100ML
INJECTION, SOLUTION INTRAVENOUS CONTINUOUS
Status: DISCONTINUED | OUTPATIENT
Start: 2022-08-22 | End: 2022-08-22 | Stop reason: ALTCHOICE

## 2022-08-22 RX ORDER — POLYETHYLENE GLYCOL 3350 17 G/17G
17 POWDER, FOR SOLUTION ORAL DAILY
Status: DISCONTINUED | OUTPATIENT
Start: 2022-08-22 | End: 2022-08-24 | Stop reason: HOSPADM

## 2022-08-22 RX ORDER — SODIUM CHLORIDE 0.9 % (FLUSH) 0.9 %
5-40 SYRINGE (ML) INJECTION PRN
Status: DISCONTINUED | OUTPATIENT
Start: 2022-08-22 | End: 2022-08-24 | Stop reason: HOSPADM

## 2022-08-22 RX ORDER — FERROUS SULFATE 325(65) MG
325 TABLET ORAL
COMMUNITY

## 2022-08-22 RX ORDER — FENTANYL CITRATE 50 UG/ML
INJECTION, SOLUTION INTRAMUSCULAR; INTRAVENOUS PRN
Status: DISCONTINUED | OUTPATIENT
Start: 2022-08-22 | End: 2022-08-22 | Stop reason: SDUPTHER

## 2022-08-22 RX ORDER — ACETAMINOPHEN 325 MG/1
650 TABLET ORAL EVERY 4 HOURS PRN
Status: DISCONTINUED | OUTPATIENT
Start: 2022-08-22 | End: 2022-08-22

## 2022-08-22 RX ORDER — SODIUM CHLORIDE 0.9 % (FLUSH) 0.9 %
5-40 SYRINGE (ML) INJECTION EVERY 12 HOURS SCHEDULED
Status: DISCONTINUED | OUTPATIENT
Start: 2022-08-22 | End: 2022-08-22 | Stop reason: HOSPADM

## 2022-08-22 RX ORDER — LANOLIN
CREAM (ML) TOPICAL PRN
Status: DISCONTINUED | OUTPATIENT
Start: 2022-08-22 | End: 2022-08-24 | Stop reason: HOSPADM

## 2022-08-22 RX ORDER — TERBUTALINE SULFATE 1 MG/ML
0.25 INJECTION, SOLUTION SUBCUTANEOUS
Status: DISCONTINUED | OUTPATIENT
Start: 2022-08-22 | End: 2022-08-22

## 2022-08-22 RX ORDER — TRANEXAMIC ACID 10 MG/ML
1000 INJECTION, SOLUTION INTRAVENOUS
Status: DISCONTINUED | OUTPATIENT
Start: 2022-08-22 | End: 2022-08-22

## 2022-08-22 RX ORDER — ACETAMINOPHEN 325 MG/1
650 TABLET ORAL EVERY 6 HOURS
Status: DISCONTINUED | OUTPATIENT
Start: 2022-08-22 | End: 2022-08-22

## 2022-08-22 RX ORDER — ROPIVACAINE HYDROCHLORIDE 2 MG/ML
INJECTION, SOLUTION EPIDURAL; INFILTRATION; PERINEURAL CONTINUOUS PRN
Status: DISCONTINUED | OUTPATIENT
Start: 2022-08-22 | End: 2022-08-22 | Stop reason: SDUPTHER

## 2022-08-22 RX ORDER — IBUPROFEN 600 MG/1
600 TABLET ORAL EVERY 6 HOURS
Status: DISCONTINUED | OUTPATIENT
Start: 2022-08-22 | End: 2022-08-22

## 2022-08-22 RX ORDER — MISOPROSTOL 200 UG/1
800 TABLET ORAL
Status: DISCONTINUED | OUTPATIENT
Start: 2022-08-22 | End: 2022-08-22

## 2022-08-22 RX ORDER — METHYLERGONOVINE MALEATE 0.2 MG/ML
200 INJECTION INTRAVENOUS
Status: ACTIVE | OUTPATIENT
Start: 2022-08-22 | End: 2022-08-22

## 2022-08-22 RX ORDER — ONDANSETRON 8 MG/1
8 TABLET, ORALLY DISINTEGRATING ORAL EVERY 8 HOURS PRN
Status: DISCONTINUED | OUTPATIENT
Start: 2022-08-22 | End: 2022-08-24 | Stop reason: HOSPADM

## 2022-08-22 RX ORDER — SODIUM CHLORIDE, SODIUM LACTATE, POTASSIUM CHLORIDE, CALCIUM CHLORIDE 600; 310; 30; 20 MG/100ML; MG/100ML; MG/100ML; MG/100ML
1000 INJECTION, SOLUTION INTRAVENOUS CONTINUOUS
Status: DISCONTINUED | OUTPATIENT
Start: 2022-08-22 | End: 2022-08-22 | Stop reason: HOSPADM

## 2022-08-22 RX ORDER — SODIUM CHLORIDE 9 MG/ML
INJECTION, SOLUTION INTRAVENOUS PRN
Status: DISCONTINUED | OUTPATIENT
Start: 2022-08-22 | End: 2022-08-22 | Stop reason: ALTCHOICE

## 2022-08-22 RX ORDER — MISOPROSTOL 200 UG/1
200 TABLET ORAL
Status: ACTIVE | OUTPATIENT
Start: 2022-08-22 | End: 2022-08-22

## 2022-08-22 RX ORDER — SODIUM CHLORIDE, SODIUM LACTATE, POTASSIUM CHLORIDE, CALCIUM CHLORIDE 600; 310; 30; 20 MG/100ML; MG/100ML; MG/100ML; MG/100ML
INJECTION, SOLUTION INTRAVENOUS CONTINUOUS
Status: DISCONTINUED | OUTPATIENT
Start: 2022-08-22 | End: 2022-08-22

## 2022-08-22 RX ADMIN — ACETAMINOPHEN 650 MG: 325 TABLET, FILM COATED ORAL at 20:43

## 2022-08-22 RX ADMIN — FENTANYL CITRATE 100 MCG: 50 INJECTION, SOLUTION INTRAMUSCULAR; INTRAVENOUS at 12:54

## 2022-08-22 RX ADMIN — OXYCODONE 5 MG: 5 TABLET ORAL at 21:11

## 2022-08-22 RX ADMIN — Medication 166.7 ML: at 13:28

## 2022-08-22 RX ADMIN — SODIUM CHLORIDE, POTASSIUM CHLORIDE, SODIUM LACTATE AND CALCIUM CHLORIDE: 600; 310; 30; 20 INJECTION, SOLUTION INTRAVENOUS at 11:52

## 2022-08-22 RX ADMIN — ROPIVACAINE HYDROCHLORIDE 8 ML/HR: 2 INJECTION, SOLUTION EPIDURAL; INFILTRATION; PERINEURAL at 12:38

## 2022-08-22 RX ADMIN — SODIUM CHLORIDE 5 MILLION UNITS: 900 INJECTION INTRAVENOUS at 11:48

## 2022-08-22 RX ADMIN — WITCH HAZEL 40 EACH: 500 SOLUTION RECTAL; TOPICAL at 16:27

## 2022-08-22 RX ADMIN — SODIUM CHLORIDE, POTASSIUM CHLORIDE, SODIUM LACTATE AND CALCIUM CHLORIDE 500 ML: 600; 310; 30; 20 INJECTION, SOLUTION INTRAVENOUS at 12:20

## 2022-08-22 RX ADMIN — IBUPROFEN 600 MG: 600 TABLET, FILM COATED ORAL at 16:27

## 2022-08-22 RX ADMIN — Medication 2 MILLI-UNITS/MIN: at 11:51

## 2022-08-22 RX ADMIN — Medication: at 23:53

## 2022-08-22 ASSESSMENT — PAIN DESCRIPTION - LOCATION: LOCATION: ABDOMEN;PERINEUM

## 2022-08-22 ASSESSMENT — PAIN DESCRIPTION - ONSET: ONSET: ON-GOING

## 2022-08-22 ASSESSMENT — PAIN DESCRIPTION - FREQUENCY: FREQUENCY: INTERMITTENT

## 2022-08-22 ASSESSMENT — PAIN DESCRIPTION - DESCRIPTORS: DESCRIPTORS: ACHING;CRAMPING;SORE

## 2022-08-22 ASSESSMENT — PAIN SCALES - GENERAL
PAINLEVEL_OUTOF10: 7
PAINLEVEL_OUTOF10: 3

## 2022-08-22 ASSESSMENT — PAIN - FUNCTIONAL ASSESSMENT: PAIN_FUNCTIONAL_ASSESSMENT: ACTIVITIES ARE NOT PREVENTED

## 2022-08-22 ASSESSMENT — PAIN DESCRIPTION - PAIN TYPE: TYPE: ACUTE PAIN

## 2022-08-22 ASSESSMENT — PAIN DESCRIPTION - ORIENTATION: ORIENTATION: LOWER

## 2022-08-22 NOTE — ANESTHESIA PROCEDURE NOTES
Epidural Block    Patient location during procedure: OB  Start time: 8/22/2022 12:29 PM  End time: 8/22/2022 12:36 PM  Reason for block: labor epidural  Staffing  Anesthesiologist: Michael Gleason MD  Epidural  Patient position: sitting  Prep: ChloraPrep  Patient monitoring: frequent blood pressure checks  Approach: midline  Location: L3-4  Injection technique: NATALIE saline  Provider prep: mask and sterile gloves  Needle  Needle type: Tuohy   Needle gauge: 17 G  Needle length: 3.5 in  Catheter type: multi-orifice  Catheter size: 19 G  Test dose: negativeCatheter Secured: tegaderm and tape  Assessment  Hemodynamics: stable  Attempts: 1  Outcomes: uncomplicated  Preanesthetic Checklist  Completed: patient identified, IV checked, site marked, risks and benefits discussed, surgical/procedural consents, equipment checked, pre-op evaluation, timeout performed, anesthesia consent given, oxygen available and monitors applied/VS acknowledged

## 2022-08-22 NOTE — ANESTHESIA POSTPROCEDURE EVALUATION
Department of Anesthesiology  Postprocedure Note    Patient: Channing Malone  MRN: [id number]  YOB: 1990  Date of evaluation: 8/22/2022      Procedure Summary     Date: 08/22/22 Room / Location:     Anesthesia Start: 8277 Anesthesia Stop: 9700    Procedure: Labor Analgesia Diagnosis:     Scheduled Providers:  Responsible Provider: Saba Malave MD    Anesthesia Type: epidural ASA Status: 2          Anesthesia Type: No value filed.     John Phase I:      John Phase II:        Anesthesia Post Evaluation    Patient location during evaluation: PACU  Patient participation: complete - patient participated  Level of consciousness: awake and awake and alert  Airway patency: patent  Nausea & Vomiting: no nausea  Complications: no  Cardiovascular status: hemodynamically stable  Respiratory status: acceptable and BIPAP  Hydration status: euvolemic  Multimodal analgesia pain management approach

## 2022-08-22 NOTE — PROGRESS NOTES
SVE 10/100/+2 per Andrea Sims RN at 2204. Dr. Sadia Cui, hospitalist called to bedside at 1310 and Dr. Krystian Goldberg notified. Dr. Sadia Cui at bedside at [phone]. Pushing initiated at 1315. Delivery of viable female infant at 12. Apgars 8,9. VSS. Delivery of placenta at 1328. Pitocin bolus started. Repair in progress.

## 2022-08-22 NOTE — PROGRESS NOTES
SBAR OUT Report: Mother    Verbal report given to Cari Hermosillo RN (full name & credentials) on this patient, who is now being transferred to MIU (unit) for routine progression of patient care. The patient is not wearing a green \"Anesthesia-Duramorph\" band. Report consisted of patient's Situation, Background, Assessment and Recommendations (SBAR).  ID bands were compared with the identification form, and verified with the patient and receiving nurse. Information from the Nurse Handoff Report and the Rea Report was reviewed with the receiving nurse; opportunity for questions and clarification provided.

## 2022-08-22 NOTE — PROGRESS NOTES
Enmanuelshady Justa  presents for Isac Michel [address]. . 40w0d. PL and MFM notes reviewed as applicable. Taking Prenatal Vitamins: Yes  She is noticing:  ?pelvic cramping. No sx ROM. To hosp    No problem-specific Assessment & Plan notes found for this encounter.

## 2022-08-22 NOTE — H&P
Labor Induction Admission Note    Thom Villar  [id number]  Estimated Date of Delivery: 22     OB History          2    Para   1    Term   1            AB        Living   1         SAB        IAB        Ectopic        Molar        Multiple        Live Births   1                Patient admitted with pregnancy at 40w0d in labor. Seen in the office today and was noted to be 4/c/0. Sent over for IOL. Pregnancy complicated by HSV with last outbreak on . She is on valtrex. She reports symptoms have completley resolved.        Current Facility-Administered Medications   Medication Dose Route Frequency Provider Last Rate Last Admin    lactated ringers infusion   IntraVENous Continuous Aisha L Dishler,  mL/hr at 22 1152 New Bag at 22 1152    lactated ringers bolus  500 mL IntraVENous PRN Aisha L Dishler, DO        Or    lactated ringers bolus  1,000 mL IntraVENous PRN Aisha L Dishler, DO        oxytocin (PITOCIN) 30 units in 500 mL infusion  1-20 matthias-units/min IntraVENous Continuous Aisha L Dishler, DO 2 mL/hr at 22 1151 2 matthias-units/min at 22 1151    methylergonovine (METHERGINE) injection 200 mcg  200 mcg IntraMUSCular Once PRN Aisha L Dishler, DO        carboprost (HEMABATE) injection 250 mcg  250 mcg IntraMUSCular Once PRN Aisha L Dishler, DO        miSOPROStol (CYTOTEC) tablet 800 mcg  800 mcg Rectal Once PRN Aisha L Dishler, DO        tranexamic acid-NaCl IVPB premix 1,000 mg  1,000 mg IntraVENous Once PRN Aisha L Dishler, DO        oxytocin (PITOCIN) 30 units in 500 mL infusion  87.3 matthias-units/min IntraVENous Continuous PRN Aisha L Dishler, DO        And    oxytocin (PITOCIN) 10 unit bolus from the bag  10 Units IntraVENous PRN Aisha L Dishler, DO        terbutaline (BRETHINE) injection 0.25 mg  0.25 mg SubCUTAneous Once PRN Aisha L Dishler, DO        butorphanol (STADOL) injection 1 mg  1 mg IntraVENous Q3H PRN Aisha L Dishler, DO        ondansetron (ZOFRAN) injection 4 mg  4 mg IntraVENous Q6H PRN Aisha L Dishler, DO        penicillin G potassium 5 Million Units in sodium chloride 0.9 % 100 mL IVPB (mini-bag)  5 Million Units IntraVENous Once Aisha L Dishler,  mL/hr at 08/22/22 1148 5 Million Units at 08/22/22 1148    Followed by    penicillin G potassium 2.5 million units in 0.9% sodium chloride 100 mL IVPB  2.5 Million Units IntraVENous Q4H Aisha L Dishler, DO        acetaminophen (TYLENOL) tablet 650 mg  650 mg Oral Q4H PRN Aisha L Dishler, DO        famotidine (PEPCID) 20 mg in sodium chloride (PF) 10 mL injection  20 mg IntraVENous Q12H PRN Aisha L Dishler, DO        ondansetron (ZOFRAN) injection 4 mg  4 mg IntraVENous Q6H PRN Aisha L Dishler, DO            EXAM: Cervical Exam:  No signs of HSV lesions on exam. 5/50/0, AROM, scant clear fluid                Fetal Heart Rate: Cat 1        Labs:   No results found for: PCTABR, ABORH, ABSCRNEXT, HBSAGEXT, HIVEXT, RUBELLAEXT, RPREXT, GONNOEXT, CHLAMEXT, GRBSEXT       Plan: Admit for induction of labor. Group B Strep positive, will treat prophylactically with penicillin. S/p first dose. Can have epidural when she desires. Continue pitocin. No signs of active HSV outbreak and outbreak > 2 weeks ago. Will proceed with vaginal delivery.          Ayush Mueller DO  August 22, 2022   12:12 PM

## 2022-08-22 NOTE — ANESTHESIA PRE PROCEDURE
Department of Anesthesiology  Preprocedure Note       Name:  Betzaida Courtney   Age:  33 y.o.  :  1990                                          MRN:  [id number]         Date:  2022      Surgeon: * No surgeons listed *    Procedure: * No procedures listed *    Medications prior to admission:   Prior to Admission medications    Medication Sig Start Date End Date Taking? Authorizing Provider   ferrous sulfate (IRON 325) 325 (65 Fe) MG tablet Take 325 mg by mouth daily (with breakfast)   Yes Historical Provider, MD   blood glucose monitor strips Test 4 times a day & as needed for symptoms of irregular blood glucose. Dispense sufficient amount for indicated testing frequency plus additional to accommodate PRN testing needs. 22   Alvaro Thomas MD   FreeStyle Lancets MISC 1 each by Does not apply route daily 22   Alvaro Thomas MD   valACYclovir (VALTREX) 500 MG tablet Take 2 tablets by mouth in the morning.  8/4/22 10/3/22  Alvaro Thomas MD   Prenatal Vit-Fe Fumarate-FA (PRENATAL PO) Take by mouth    Historical Provider, MD       Current medications:    Current Facility-Administered Medications   Medication Dose Route Frequency Provider Last Rate Last Admin    lactated ringers infusion   IntraVENous Continuous Aisha L Dishler,  mL/hr at 22 1152 New Bag at 22 1152    lactated ringers bolus  500 mL IntraVENous PRN Aisha L Dishler, DO        Or    lactated ringers bolus  1,000 mL IntraVENous PRN Aisha L Dishler, DO        oxytocin (PITOCIN) 30 units in 500 mL infusion  1-20 matthias-units/min IntraVENous Continuous Aisha L Dishler, DO   Stopped at 22 1232    methylergonovine (METHERGINE) injection 200 mcg  200 mcg IntraMUSCular Once PRN Aisha L Dishler, DO        carboprost (HEMABATE) injection 250 mcg  250 mcg IntraMUSCular Once PRN Aisha L Dishler, DO        miSOPROStol (CYTOTEC) tablet 800 mcg  800 mcg Rectal Once PRN Aisha L Dishler, DO        tranexamic acid-NaCl IVPB premix 1,000 mg  1,000 mg IntraVENous Once PRN Aisha L Dishler, DO        oxytocin (PITOCIN) 30 units in 500 mL infusion  87.3 matthias-units/min IntraVENous Continuous PRN Aisha L Chiki, DO        And    oxytocin (PITOCIN) 10 unit bolus from the bag  10 Units IntraVENous PRN Aisha L Cherieler, DO        terbutaline (BRETHINE) injection 0.25 mg  0.25 mg SubCUTAneous Once PRN Aisha L Cherieler, DO        butorphanol (STADOL) injection 1 mg  1 mg IntraVENous Q3H PRN Aisha L Dishler, DO        ondansetron (ZOFRAN) injection 4 mg  4 mg IntraVENous Q6H PRN Aisha L Dishler, DO        penicillin G potassium 2.5 million units in 0.9% sodium chloride 100 mL IVPB  2.5 Million Units IntraVENous Q4H [facility] L Cherieler, DO        acetaminophen (TYLENOL) tablet 650 mg  650 mg Oral Q4H PRN [facility] L Dishler, DO        famotidine (PEPCID) 20 mg in sodium chloride (PF) 10 mL injection  20 mg IntraVENous Q12H PRN Aisha L Dishler, DO        ondansetron (ZOFRAN) injection 4 mg  4 mg IntraVENous Q6H PRN [facility] L Dishler, DO         Facility-Administered Medications Ordered in Other Encounters   Medication Dose Route Frequency Provider Last Rate Last Admin    ropivacaine (NAROPIN) 0.2% injection 0.2%   Epidural Continuous PRN TI Carlisle - CRNA 8 mL/hr at 08/22/22 1238 8 mL/hr at 08/22/22 1238       Allergies:     Allergies   Allergen Reactions    Nickel Rash    Metronidazole Other (See Comments)     Lip swelling    Sulfa Antibiotics Other (See Comments)     \"I couldn't walk\"  \"I couldn't walk\"       Problem List:    Patient Active Problem List   Diagnosis Code    Supervision of normal pregnancy Z34.90    Dizziness R42    Family history of breast cancer in mother Z80.2    Pain of left thumb M79.645    Herpes simplex virus type 2 (HSV-2) infection affecting pregnancy in third trimester, antepartum O80.200, B00.9    Encounter for induction of labor Z34.90       Past Medical History:  No past medical history on file. Past Surgical History:        Procedure Laterality Date    GALLBLADDER SURGERY         Social History:    Social History     Tobacco Use    Smoking status: Never    Smokeless tobacco: Never   Substance Use Topics    Alcohol use: Not Currently                                Counseling given: Not Answered      Vital Signs (Current):   Vitals:    08/22/22 1129 08/22/22 1144 08/22/22 1200   BP: 113/78 105/76 117/76   Pulse: 80 78 80   Resp: 20     Temp: 98.4 °F (36.9 °C)     TempSrc: Oral     SpO2: 97%                                                BP Readings from Last 3 Encounters:   08/22/22 117/76   08/22/22 104/66   08/15/22 114/78       NPO Status:                                                                                 BMI:   Wt Readings from Last 3 Encounters:   08/22/22 162 lb (73.5 kg)   08/15/22 164 lb (74.4 kg)   08/09/22 162 lb (73.5 kg)     There is no height or weight on file to calculate BMI.    CBC:   Lab Results   Component Value Date/Time    WBC 7.1 08/22/2022 11:30 AM    RBC 4.85 08/22/2022 11:30 AM    HGB 12.7 08/22/2022 11:30 AM    HCT 37.5 08/22/2022 11:30 AM    MCV 77.3 08/22/2022 11:30 AM    RDW 16.4 08/22/2022 11:30 AM     08/22/2022 11:30 AM       CMP:   Lab Results   Component Value Date/Time     09/07/2021 09:12 AM    K 4.1 09/07/2021 09:12 AM     09/07/2021 09:12 AM    CO2 23 09/07/2021 09:12 AM    BUN 8 09/07/2021 09:12 AM    CREATININE 0.74 09/07/2021 09:12 AM    GFRAA 126 09/07/2021 09:12 AM    AGRATIO 1.6 09/07/2021 09:12 AM    GLUCOSE 86 09/07/2021 09:12 AM    PROT 7.3 09/07/2021 09:12 AM    CALCIUM 9.2 09/07/2021 09:12 AM    BILITOT 0.5 09/07/2021 09:12 AM    ALKPHOS 54 09/07/2021 09:12 AM    AST 14 09/07/2021 09:12 AM    ALT 12 09/07/2021 09:12 AM       POC Tests: No results for input(s): POCGLU, POCNA, POCK, POCCL, POCBUN, POCHEMO, POCHCT in the last 72 hours.     Coags: No results found for: PROTIME, INR, APTT    HCG (If Applicable): No results found for: PREGTESTUR, PREGSERUM, HCG, HCGQUANT     ABGs: No results found for: PHART, PO2ART, QDM5QKB, KSX0VBQ, BEART, V8YMPJJL     Type & Screen (If Applicable):  No results found for: LABABO, LABRH    Drug/Infectious Status (If Applicable):  No results found for: HIV, HEPCAB    COVID-19 Screening (If Applicable): No results found for: COVID19        Anesthesia Evaluation  Patient summary reviewed and Nursing notes reviewed  Airway: Mallampati: II  TM distance: >3 FB   Neck ROM: full     Dental:          Pulmonary:Negative Pulmonary ROS and normal exam                               Cardiovascular:  Exercise tolerance: good (>4 METS),           Rhythm: regular  Rate: normal                    Neuro/Psych:   Negative Neuro/Psych ROS              GI/Hepatic/Renal: Neg GI/Hepatic/Renal ROS            Endo/Other: Negative Endo/Other ROS             Pt had no PAT visit       Abdominal:             Vascular: negative vascular ROS. Other Findings:           Anesthesia Plan      epidural     ASA 2       Induction: intravenous. MIPS: Postoperative opioids intended and Prophylactic antiemetics administered. Anesthetic plan and risks discussed with patient.       Plan discussed with surgical team.    Attending anesthesiologist reviewed and agrees with Derrick Friedman MD   8/22/2022

## 2022-08-22 NOTE — PROGRESS NOTES
Pt admitted to [facility] for IOL. Consents signed and witnessed. IV started and labs sent. Admission assessment as documented. EFM/toco applied. VSS. Orders received to initiate pitocin induction.

## 2022-08-22 NOTE — PROGRESS NOTES
Alison Turk at bedside at [phone]. DARA Lee at bedside at [phone]    Assisted pt to sitting up on bedside at 1225. Timeout completed at [phone] with MD, DARA and myself at bedside. Test dose given at 1236. Negative reaction. Dose given at 60-74-66-62. Pt assisted to lying back in right tilt position. See anesthesia record for details. See vital sign flow sheet for BP. Tolerated procedure well.

## 2022-08-23 PROCEDURE — 6370000000 HC RX 637 (ALT 250 FOR IP): Performed by: OBSTETRICS & GYNECOLOGY

## 2022-08-23 PROCEDURE — 1100000000 HC RM PRIVATE

## 2022-08-23 RX ADMIN — IBUPROFEN 600 MG: 600 TABLET, FILM COATED ORAL at 05:44

## 2022-08-23 RX ADMIN — IBUPROFEN 600 MG: 600 TABLET, FILM COATED ORAL at 23:43

## 2022-08-23 RX ADMIN — ACETAMINOPHEN 650 MG: 325 TABLET, FILM COATED ORAL at 14:50

## 2022-08-23 RX ADMIN — IBUPROFEN 600 MG: 600 TABLET, FILM COATED ORAL at 00:04

## 2022-08-23 RX ADMIN — IBUPROFEN 600 MG: 600 TABLET, FILM COATED ORAL at 17:13

## 2022-08-23 RX ADMIN — ACETAMINOPHEN 650 MG: 325 TABLET, FILM COATED ORAL at 08:42

## 2022-08-23 RX ADMIN — OXYCODONE 5 MG: 5 TABLET ORAL at 01:17

## 2022-08-23 RX ADMIN — Medication: at 00:04

## 2022-08-23 RX ADMIN — OXYCODONE 5 MG: 5 TABLET ORAL at 05:44

## 2022-08-23 RX ADMIN — ACETAMINOPHEN 650 MG: 325 TABLET, FILM COATED ORAL at 20:11

## 2022-08-23 RX ADMIN — IBUPROFEN 600 MG: 600 TABLET, FILM COATED ORAL at 11:41

## 2022-08-23 RX ADMIN — POLYETHYLENE GLYCOL (3350) 17 G: 17 POWDER, FOR SOLUTION ORAL at 09:35

## 2022-08-23 RX ADMIN — ACETAMINOPHEN 650 MG: 325 TABLET, FILM COATED ORAL at 02:50

## 2022-08-23 ASSESSMENT — PAIN DESCRIPTION - ONSET
ONSET: ON-GOING

## 2022-08-23 ASSESSMENT — PAIN DESCRIPTION - LOCATION
LOCATION: ABDOMEN;PERINEUM
LOCATION: ABDOMEN
LOCATION: ABDOMEN;PERINEUM
LOCATION: ABDOMEN
LOCATION: ABDOMEN;PERINEUM
LOCATION: ABDOMEN;PERINEUM

## 2022-08-23 ASSESSMENT — PAIN SCALES - GENERAL
PAINLEVEL_OUTOF10: 3
PAINLEVEL_OUTOF10: 7
PAINLEVEL_OUTOF10: 3
PAINLEVEL_OUTOF10: 2
PAINLEVEL_OUTOF10: 1
PAINLEVEL_OUTOF10: 1
PAINLEVEL_OUTOF10: 8
PAINLEVEL_OUTOF10: 2
PAINLEVEL_OUTOF10: 4

## 2022-08-23 ASSESSMENT — PAIN DESCRIPTION - DESCRIPTORS
DESCRIPTORS: ACHING;CRAMPING;SORE
DESCRIPTORS: CRAMPING
DESCRIPTORS: ACHING;CRAMPING;SORE
DESCRIPTORS: ACHING;CRAMPING;SORE
DESCRIPTORS: CRAMPING
DESCRIPTORS: ACHING;CRAMPING;SORE

## 2022-08-23 ASSESSMENT — PAIN - FUNCTIONAL ASSESSMENT
PAIN_FUNCTIONAL_ASSESSMENT: ACTIVITIES ARE NOT PREVENTED

## 2022-08-23 ASSESSMENT — PAIN DESCRIPTION - PAIN TYPE
TYPE: ACUTE PAIN

## 2022-08-23 ASSESSMENT — PAIN DESCRIPTION - FREQUENCY
FREQUENCY: INTERMITTENT

## 2022-08-23 ASSESSMENT — PAIN DESCRIPTION - ORIENTATION
ORIENTATION: LOWER
ORIENTATION: LOWER
ORIENTATION: ANTERIOR;LOWER
ORIENTATION: ANTERIOR;LOWER
ORIENTATION: LOWER
ORIENTATION: LOWER

## 2022-08-23 NOTE — CARE COORDINATION
Chart reviewed - no needs identified. SW met with patient to complete initial assessment. Patient denies any history of postpartum depression/anxiety. Patient given informational packet on  mood & anxiety disorders (resources/education). Family denies any additional needs from  at this time. Family has 's contact information should any needs/questions arise.     ISABELLA Delacruz, [address] [facility]   [phone]

## 2022-08-23 NOTE — LACTATION NOTE

## 2022-08-23 NOTE — PROGRESS NOTES
Post-Partum Day Number 1 Progress Note  Bala Opara  [id number]    Patient doing well post-partum without significant complaint. Voiding without difficulty, normal lochia. Vitals:  Patient Vitals for the past 24 hrs:   BP Temp Temp src Pulse Resp SpO2   22 0705 103/68 98 °F (36.7 °C) Oral 79 16 99 %   22 2351 94/68 98.2 °F (36.8 °C) Oral 71 14 97 %   22 1714 (!) 98/54 98.8 °F (37.1 °C) Oral 79 18 --   22 1522 (!) 96/57 -- -- 91 -- --   22 1507 113/66 -- -- 71 -- --   22 1452 122/76 -- -- 88 -- --   22 1437 (!) 108/53 -- -- 83 -- --   22 1423 (!) 127/55 -- -- 70 -- --   22 1406 (!) 133/56 -- -- 86 -- --   22 1301 119/63 -- -- 96 -- --   22 1255 115/66 -- -- 100 -- --   22 1249 (!) 118/59 -- -- 82 -- --   22 1246 (!) 122/59 98 °F (36.7 °C) Oral 89 -- --   22 1243 (!) 115/56 -- -- 87 -- --   22 1241 (!) 114/56 -- -- 89 -- --   22 1237 118/71 -- -- 99 -- 97 %   22 1232 -- -- -- 98 -- 100 %   22 1216 120/71 -- -- 80 -- --   22 1200 117/76 -- -- 80 -- --   22 1144 105/76 -- -- 78 -- --   22 1129 113/78 98.4 °F (36.9 °C) Oral 80 20 97 %     Temp (24hrs), Av.3 °F (36.8 °C), Min:98 °F (36.7 °C), Max:98.8 °F (37.1 °C)      Vital signs stable, afebrile. Exam:  Patient without distress.                Abdomen soft, fundus firm at level of umbilicus, nontender               Labs:   Recent Results (from the past 24 hour(s))   CBC with Auto Differential    Collection Time: 22 11:30 AM   Result Value Ref Range    WBC 7.1 4.3 - 11.1 K/uL    RBC 4.85 4.05 - 5.2 M/uL    Hemoglobin 12.7 11.7 - 15.4 g/dL    Hematocrit 37.5 35.8 - 46.3 %    MCV 77.3 (L) 79.6 - 97.8 FL    MCH 26.2 26.1 - 32.9 PG    MCHC 33.9 31.4 - 35.0 g/dL    RDW 16.4 (H) 11.9 - 14.6 %    Platelets 234 859 - 342 K/uL    MPV 10.0 9.4 - 12.3 FL    nRBC 0.00 0.0 - 0.2 K/uL    Differential Type AUTOMATED      Seg Neutrophils 65 43 - 78 %    Lymphocytes 25 13 - 44 %    Monocytes 8 4.0 - 12.0 %    Eosinophils % 2 0.5 - 7.8 %    Basophils 0 0.0 - 2.0 %    Immature Granulocytes 0 0.0 - 5.0 %    Segs Absolute 4.6 1.7 - 8.2 K/UL    Absolute Lymph # 1.8 0.5 - 4.6 K/UL    Absolute Mono # 0.6 0.1 - 1.3 K/UL    Absolute Eos # 0.1 0.0 - 0.8 K/UL    Basophils Absolute 0.0 0.0 - 0.2 K/UL    Absolute Immature Granulocyte 0.0 0.0 - 0.5 K/UL   TYPE AND SCREEN    Collection Time: 08/22/22 11:30 AM   Result Value Ref Range    Crossmatch expiration date 08/25/2022,6132     ABO/Rh A POSITIVE     Antibody Screen NEG        Assessment and Plan:  Patient appears to be having uncomplicated post-partum course. Continue routine perineal care and maternal education. Plan discharge tomorrow if no problems occur. Pt had Qs about whether or not to continue her valtrex. Rare outbreaks. Was on it for supp in late preg. She can stop or continue 500mg qd.

## 2022-08-23 NOTE — LACTATION NOTE
This note was copied from a baby's chart. Observed at breast in cradle on L and R.  Baby fed well. Demonstrated manual lip flange. Encouraged frequent feeding and watch output. Experienced mom reports feedings have been going well.

## 2022-08-23 NOTE — PLAN OF CARE
Problem: Vaginal Birth or  Section  Goal: Fetal and maternal status remain reassuring during the birth process  Description:  Birth OB-Pregnancy care plan goal which identifies if the fetal and maternal status remain reassuring during the birth process  Outcome: Progressing     Problem: Postpartum  Goal: Experiences normal postpartum course  Description:  Postpartum OB-Pregnancy care plan goal which identifies if the mother is experiencing a normal postpartum course  Outcome: Progressing  Goal: Appropriate maternal -  bonding  Description:  Postpartum OB-Pregnancy care plan goal which identifies if the mother and  are bonding appropriately  Outcome: Progressing  Goal: Establishment of infant feeding pattern  Description:  Postpartum OB-Pregnancy care plan goal which identifies if the mother is establishing a feeding pattern with their   Outcome: Progressing  Goal: Incisions, wounds, or drain sites healing without S/S of infection  Outcome: Progressing     Problem: Pain  Goal: Verbalizes/displays adequate comfort level or baseline comfort level  Outcome: Progressing  Flowsheets (Taken 2022)  Verbalizes/displays adequate comfort level or baseline comfort level:   Encourage patient to monitor pain and request assistance   Assess pain using appropriate pain scale   Administer analgesics based on type and severity of pain and evaluate response   Implement non-pharmacological measures as appropriate and evaluate response   Consider cultural and social influences on pain and pain management   Notify Licensed Independent Practitioner if interventions unsuccessful or patient reports new pain     Problem: Infection - Adult  Goal: Absence of infection at discharge  Outcome: Progressing  Goal: Absence of infection during hospitalization  Outcome: Progressing  Goal: Absence of fever/infection during anticipated neutropenic period  Outcome: Progressing     Problem: Safety - Adult  Goal: Free from fall injury  Outcome: Progressing     Problem: Discharge Planning  Goal: Discharge to home or other facility with appropriate resources  Outcome: Progressing     Problem: Chronic Conditions and Co-morbidities  Goal: Patient's chronic conditions and co-morbidity symptoms are monitored and maintained or improved  Outcome: Progressing

## 2022-08-24 VITALS
DIASTOLIC BLOOD PRESSURE: 73 MMHG | SYSTOLIC BLOOD PRESSURE: 110 MMHG | RESPIRATION RATE: 17 BRPM | OXYGEN SATURATION: 98 % | TEMPERATURE: 98.5 F | HEART RATE: 70 BPM

## 2022-08-24 PROCEDURE — 6370000000 HC RX 637 (ALT 250 FOR IP): Performed by: OBSTETRICS & GYNECOLOGY

## 2022-08-24 RX ORDER — IBUPROFEN 800 MG/1
800 TABLET ORAL EVERY 8 HOURS PRN
Qty: 60 TABLET | Refills: 0 | Status: SHIPPED | OUTPATIENT
Start: 2022-08-24 | End: 2022-10-25

## 2022-08-24 RX ORDER — OXYCODONE HYDROCHLORIDE 5 MG/1
5 TABLET ORAL EVERY 4 HOURS PRN
Qty: 10 TABLET | Refills: 0 | Status: SHIPPED | OUTPATIENT
Start: 2022-08-24 | End: 2022-08-29

## 2022-08-24 RX ADMIN — ACETAMINOPHEN 650 MG: 325 TABLET, FILM COATED ORAL at 09:27

## 2022-08-24 RX ADMIN — ACETAMINOPHEN 650 MG: 325 TABLET, FILM COATED ORAL at 01:53

## 2022-08-24 RX ADMIN — IBUPROFEN 600 MG: 600 TABLET, FILM COATED ORAL at 05:40

## 2022-08-24 RX ADMIN — POLYETHYLENE GLYCOL (3350) 17 G: 17 POWDER, FOR SOLUTION ORAL at 09:28

## 2022-08-24 ASSESSMENT — PAIN SCALES - GENERAL: PAINLEVEL_OUTOF10: 1

## 2022-08-24 NOTE — PROGRESS NOTES
Progress Note                               Patient: Sue Berry MRN: [id number]  SSN: [id number]    YOB: 1990  Age: 33 y.o. Sex: female      Postpartum Day Number 2    Subjective:     Patient doing well without significant complaints. Ambulating, voiding without difficulty Patient reports normal lochia. . Infant: Breastfeeding and/or pumping    Objective:     Patient Vitals for the past 18 hrs:   Temp Pulse Resp BP SpO2   22 0808 98.5 °F (36.9 °C) 70 17 110/73 98 %   22 2321 98.6 °F (37 °C) 70 14 106/74 96 %        Temp (24hrs), Av.4 °F (36.9 °C), Min:98.2 °F (36.8 °C), Max:98.6 °F (37 °C)      Physical Exam:    Patient without distress. Neuro / Psych grossly WNL, A&O X 3    Lab/Data Review: All lab results for the last 24 hours reviewed. GBS: No results found for: GRBSEXT  Blood Type:   Lab Results   Component Value Date/Time    ABORH A POSITIVE 2022 11:30 AM        Assessment and Plan:     Patient appears to be having an uncomplicated postpartum course. Continue routine perineal care and maternal education. , Will discharge home today. , instructions and precautions reviewed. Req ibuprofen + jovita prn pain.     Sherron Kirk MD

## 2022-08-24 NOTE — LACTATION NOTE
This note was copied from a baby's chart. In to follow up w/ mom and infant for discharge. Mom feels like her milk is starting to come in more and has been leaking this am. She feels baby fed well overnight. Does have some nipple soreness. No outside damage noted on appearance. Encouraged to rotate breastfeeding positions and use nipple cream as needed. Continue to work on deep, wide latch. Baby has only had 1 wet diaper per mom in second day of life. Encouraged mom to proactively pick her up q 2 hrs and feed her always offering both breasts. If still no second diaper by 48 hrs, should start pumping 10 minutes after feeds and give back any extra breast milk obtained, and also any additional formula needed if still not producing adequate diapers. Showed her chart to monitor how many wet/dirty diapers to expect each day. Notify MD as well for any concerns. Reviewed discharge info and how to manage period of engorgement. Mom to follow up Breast feeding center as well at [facility].

## 2022-08-24 NOTE — LACTATION NOTE

## 2022-08-24 NOTE — DISCHARGE INSTRUCTIONS
Discharge instruction to follow: Activity: Pelvis rest for 6 weeks     No heavy lifting over 15 lbs for 2 weeks     No driving for 2 weeks     No push/pull motion such as sweeping or vacuuming for 2 weeks     No tub baths for 6 weeks    If using merced-bottle continue to use until comfortable stopping. Change sanitary pad after each urination or bowel movement. Call MD for the following:      Fever over 101 F; pain not relieved by medication; foul smelling vaginal discharge or an increase in vaginal bleeding. Take medication as prescribed. Follow up with MD as order. Postpartum: Care Instructions  Overview  After childbirth (postpartum period), your body goes through many changes. Some of these changes happen over several weeks. In the hours after delivery, your body will begin to recover from childbirth while it prepares to breastfeed your . You may feel emotional during this time. Your hormones can shift yourmood without warning for no clear reason. In the first couple of weeks after childbirth, it's common to have emotions that change from happy to sad. You may find it hard to sleep. You may cry a lot. This is called the \"baby blues. \" These overwhelming emotions often go away within a couple of days or weeks. But it's important to discuss your feelingswith your doctor. It's easy to get too tired and overwhelmed during the first weeks after childbirth. Don't try to do too much. Get rest whenever you can, accept helpfrom others, and eat well and drink plenty of fluids. In the first couple of weeks after you give birth, your doctor or midwife may want to check in with you and make a plan for any follow-up care you may need. You will likely have a complete postpartum visit in the first 3 months after delivery. At that time, your doctor or midwife will check on your recovery from childbirth and see how you're doing with your emotions. You may also discussyour concerns or questions.   Follow-up care is a key part of your treatment and safety. Be sure to make and go to all appointments, and call your doctor if you are having problems. It's also a good idea to know your test results and keep alist of the medicines you take. How can you care for yourself at home? Sleep or rest when your baby sleeps. Get help with household chores from family or friends, if you can. Don't try to do it all yourself. If you have hemorrhoids or swelling or pain around the opening of your vagina, try using cold and heat. You can put ice or a cold pack on the area for 10 to 20 minutes at a time. Put a thin cloth between the ice and your skin. Also try sitting in a few inches of warm water (sitz bath) 3 times a day and after bowel movements. Take pain medicines exactly as directed. If the doctor gave you a prescription medicine for pain, take it as prescribed. If you are not taking a prescription pain medicine, ask your doctor if you can take an over-the-counter medicine. Eat more fiber to avoid constipation. Include foods such as whole-grain breads and cereals, raw vegetables, raw and dried fruits, and beans. Drink plenty of fluids. If you have kidney, heart, or liver disease and have to limit fluids, talk with your doctor before you increase the amount of fluids you drink. Do not rinse inside your vagina with fluids (douche). If you have stitches, keep the area clean by pouring or spraying warm water over the area outside your vagina and anus after you use the toilet. Keep a list of questions to ask your doctor or midwife. Your questions might be about:  Changes in your breasts, such as lumps or soreness. When to expect your menstrual period to start again. What form of birth control is best for you. Weight you have put on during the pregnancy. Exercise options. What foods and drinks are best for you, especially if you are breastfeeding. Problems you might be having with breastfeeding. When you can have sex.  You may want to talk about lubricants for your vagina. Any feelings of sadness or restlessness that you are having. When should you call for help? Share this information with your partner, family, or a friend. They can help you watch for warning signs. Call 911 anytime you think you may need emergency care. For example, call if:    You have thoughts of harming yourself, your baby, or another person. You passed out (lost consciousness). You have chest pain, are short of breath, or cough up blood. You have a seizure. Call your doctor now or seek immediate medical care if:    You have signs of hemorrhage (too much bleeding), such as:  Heavy vaginal bleeding. This means that you are soaking through one or more pads in an hour. Or you pass blood clots bigger than an egg. Feeling dizzy or lightheaded, or you feel like you may faint. Feeling so tired or weak that you cannot do your usual activities. A fast or irregular heartbeat. New or worse belly pain. You have signs of infection, such as:  A fever. Vaginal discharge that smells bad. New or worse belly pain. You have symptoms of a blood clot in your leg (called a deep vein thrombosis), such as:  Pain in the calf, back of the knee, thigh, or groin. Redness and swelling in your leg or groin. You have signs of preeclampsia, such as:  Sudden swelling of your face, hands, or feet. New vision problems (such as dimness, blurring, or seeing spots). A severe headache. Watch closely for changes in your health, and be sure to contact your doctor if:    Your vaginal bleeding isn't decreasing. You feel sad, anxious, or hopeless for more than a few days. You are having problems with your breasts or breastfeeding. Where can you learn more? Go to https://yaritza[facility]. org and sign in to your [employer] account. Enter A758 in the [employer] box to learn more about \"Postpartum: Care Instructions. \"     If you do not have an account, please click on the \"Sign Up Now\" link. Current as of: February 23, 2022               Content Version: 13.3  © 2006-2022 [employer]. Care instructions adapted under license by [facility] ([facility]). If you have questions about a medical condition or this instruction, always ask your healthcare professional. [address] any warranty or liability for your use of this information.

## 2022-08-24 NOTE — PLAN OF CARE
Problem: Vaginal Birth or  Section  Goal: Fetal and maternal status remain reassuring during the birth process  Description:  Birth OB-Pregnancy care plan goal which identifies if the fetal and maternal status remain reassuring during the birth process  Outcome: Completed     Problem: Postpartum  Goal: Experiences normal postpartum course  Description:  Postpartum OB-Pregnancy care plan goal which identifies if the mother is experiencing a normal postpartum course  Outcome: Completed  Goal: Appropriate maternal -  bonding  Description:  Postpartum OB-Pregnancy care plan goal which identifies if the mother and  are bonding appropriately  Outcome: Completed  Goal: Establishment of infant feeding pattern  Description:  Postpartum OB-Pregnancy care plan goal which identifies if the mother is establishing a feeding pattern with their   Outcome: Completed  Goal: Incisions, wounds, or drain sites healing without S/S of infection  Outcome: Completed  Flowsheets (Taken 2022)  Incisions, Wounds, or Drain Sites Healing Without Sign and Symptoms of Infection:   TWICE DAILY: Assess and document dressing/incision, wound bed, drain sites and surrounding tissue   Implement wound care per orders     Problem: Pain  Goal: Verbalizes/displays adequate comfort level or baseline comfort level  Outcome: Completed  Flowsheets (Taken 2022)  Verbalizes/displays adequate comfort level or baseline comfort level:   Encourage patient to monitor pain and request assistance   Assess pain using appropriate pain scale   Administer analgesics based on type and severity of pain and evaluate response   Implement non-pharmacological measures as appropriate and evaluate response     Problem: Infection - Adult  Goal: Absence of infection at discharge  Outcome: Completed  Goal: Absence of infection during hospitalization  Outcome: Completed  Goal: Absence of fever/infection during anticipated neutropenic period  Outcome: Completed     Problem: Safety - Adult  Goal: Free from fall injury  Outcome: Completed     Problem: Discharge Planning  Goal: Discharge to home or other facility with appropriate resources  Outcome: Completed     Problem: Chronic Conditions and Co-morbidities  Goal: Patient's chronic conditions and co-morbidity symptoms are monitored and maintained or improved  Outcome: Completed

## 2022-08-24 NOTE — PROGRESS NOTES
MMR and Tdap VIS sheets given. Pt declines stating she has appointment with her primary care MD in September and would prefer to get then.

## 2022-08-24 NOTE — L&D DELIVERY NOTE
Mother's Information      Labor Events     Labor?: No  Cervical Ripening:   Now               Star Jackson Girl Phillis Severs [[id number]]      Labor Events     Labor?: No   Steroids?: None  Cervical Ripening Date/Time:     Antibiotics Received during Labor?: Yes  Rupture Date/Time: 22 12:08:00   Rupture Type: AROM  Fluid Color: Clear  Fluid Odor: None  Fluid Volume: Scant  Induction: Oxytocin  Augmentation: AROM  Labor Complications: None       Anesthesia    Method: Epidural       Start Pushing      Labor onset date/time: 22 12:02:32 Now     Dilation complete date/time: 22 13:10:00 EDT Now     Start pushing date/time: 2022 13:15:03   Decision date/time (emergent ):           Labor Length    1st stage: 1h 07m  2nd stage: 0h 15m  3rd stage: 0h 03m       Delivery ([place])      Delivery Date/Time:  22 13:25:00   Delivery Type: Vaginal, Spontaneous    Details:             Presentation    Presentation: Vertex  _: Occiput  _: Posterior       Shoulder Dystocia    Shoulder Dystocia Present?: No  Add Second Maneuver  Add Third Maneuver  Add Fourth Maneuver  Add Fifth Maneuver  Add Sixth Maneuver  Add Seventh Maneuver  Add Eighth Maneuver  Add Ninth Maneuver       Assisted Delivery Details    Forceps Attempted?: No  Vacuum Extractor Attempted?: No       Document Additional Attempt         Document Additional Attempt                 Cord    Vessels: 3 Vessels  Complications: None  Delayed Cord Clamping?: Yes  Cord Clamped Date/Time: 2022 13:27:09  Cord Blood Disposition: Lab  Gases Sent?: No       Placenta    Date/Time: 2022 13:28:00  Removal: Spontaneous  Appearance: Intact  Disposition: Discarded       Lacerations    Episiotomy: None  Perineal Lacerations: 1st       Vaginal Counts    Initial Count Personnel: Alejo YOUNGER  Initial Count Verified By: ROSARIO HOANG RN    Sponges [place] [employer]   Initial Counts Correct Correct Correct Final Counts Correct Correct Correct   Final Count Personnel: Amy YOUNGER  Final Count Verified By: Rakel Lincoln  Accurate Final Count?: Yes  If the count is incorrect due to Intentionally Retained Foreign Object (IRFO) add the IRFO LDA in Lines/Drains. Add LDA: Link to [facility]       Blood Loss  Mother: Lake Arellano #[id number]     Start of Mother's Information      Delivery Blood Loss  22 1202 - 22 1325      Quantitative Blood Loss (mL) Hospital Encounter 195 grams    Total  195 mL               End of Mother's Information  Mother: Lake Arellano #[id number]                Delivery Providers    Delivering clinician: Ange Hensley MD     Provider Role    Nicole Joseph,  Obstetrician    Enma Rivers, RN Primary Nurse    Abdulaziz Bravo, RN Primary [place] Nurse    Cindy Smith MD Anesthesiologist    Gilma Hdez, APRN - CRNA Nurse Anesthetist    Erin Hurtado RN Charge Nurse    Matthew Zhao Scrub Tech              [place] Assessment    Living Status: Living  Delivery Location Comment: [id number]     Apgar Scoring Key:    0 1 2    Skin Color: Blue or pale Acrocyanotic Completely pink    Heart Rate: Absent <100 bpm >100 bpm    Reflex Irritability: No response Grimace Cry or active withdrawal    Muscle Tone: Limp Some flexion Active motion    Respiratory Effort: Absent Weak cry; hypoventilation Good, crying                      Skin Color:   Heart Rate:   Reflex Irritability:   Muscle Tone:   Respiratory Effort:    Total:            1 Minute:    0    2    2    2    2    8        Apgar 1 total from OB History    5 Minute:    1    2    2    2    2    9        Apgar 5 total from OB History    10 Minute:              15 Minute:              20 Minute:                        Apgars Assigned By: Equilla Schanz              Resuscitation    Method: Bulb Suction, Stimulation              Measurements      Birth Weight: 2940 g Birth Length: 0.5 m     Head Circumference: 0.34 m Chest Circumference: 0.31 m              Title      Skin to Skin Initiation Date/Time: 8/22/22 13:45:00 EDT     Skin to Skin With: Mother     Skin to Skin End Date/Time:       Breastfeeding Initiated Date/Time: 8/22/2022 13:47:49                  Called by RN that patent was complete and felt need to push. I was in the hospital, but scrubbed into another procedure. [facility] was asked to attend delivery. When I arrived to the room baby and placenta were delivered and repair was in progress. See note from Dr. Tatyana Rider for further delivery details.      Aisha Monet, DO

## 2022-09-07 DIAGNOSIS — Z00.00 ENCOUNTER FOR GENERAL ADULT MEDICAL EXAMINATION WITHOUT ABNORMAL FINDINGS: Primary | ICD-10-CM

## 2022-09-08 ENCOUNTER — NURSE ONLY (OUTPATIENT)
Dept: INTERNAL MEDICINE CLINIC | Facility: CLINIC | Age: 32
End: 2022-09-08

## 2022-09-08 DIAGNOSIS — Z00.00 ENCOUNTER FOR GENERAL ADULT MEDICAL EXAMINATION WITHOUT ABNORMAL FINDINGS: ICD-10-CM

## 2022-09-08 LAB
ALBUMIN SERPL-MCNC: 3.6 G/DL (ref 3.5–5)
ALBUMIN/GLOB SERPL: 1 {RATIO} (ref 1.2–3.5)
ALP SERPL-CCNC: 74 U/L (ref 50–136)
ALT SERPL-CCNC: 29 U/L (ref 12–65)
ANION GAP SERPL CALC-SCNC: 7 MMOL/L (ref 4–13)
AST SERPL-CCNC: 14 U/L (ref 15–37)
BILIRUB SERPL-MCNC: 0.9 MG/DL (ref 0.2–1.1)
BUN SERPL-MCNC: 8 MG/DL (ref 6–23)
CALCIUM SERPL-MCNC: 9.3 MG/DL (ref 8.3–10.4)
CHLORIDE SERPL-SCNC: 109 MMOL/L (ref 101–110)
CHOLEST SERPL-MCNC: 230 MG/DL
CO2 SERPL-SCNC: 26 MMOL/L (ref 21–32)
CREAT SERPL-MCNC: 0.8 MG/DL (ref 0.6–1)
ERYTHROCYTE [DISTWIDTH] IN BLOOD BY AUTOMATED COUNT: 15.9 % (ref 11.9–14.6)
GLOBULIN SER CALC-MCNC: 3.6 G/DL (ref 2.3–3.5)
GLUCOSE SERPL-MCNC: 70 MG/DL (ref 65–100)
HCT VFR BLD AUTO: 43.7 % (ref 35.8–46.3)
HDLC SERPL-MCNC: 73 MG/DL (ref 40–60)
HDLC SERPL: 3.2 {RATIO}
HGB BLD-MCNC: 13.9 G/DL (ref 11.7–15.4)
LDLC SERPL CALC-MCNC: 142.8 MG/DL
MCH RBC QN AUTO: 26.7 PG (ref 26.1–32.9)
MCHC RBC AUTO-ENTMCNC: 31.8 G/DL (ref 31.4–35)
MCV RBC AUTO: 84 FL (ref 79.6–97.8)
NRBC # BLD: 0 K/UL (ref 0–0.2)
PLATELET # BLD AUTO: 367 K/UL (ref 150–450)
PMV BLD AUTO: 9.7 FL (ref 9.4–12.3)
POTASSIUM SERPL-SCNC: 4 MMOL/L (ref 3.5–5.1)
PROT SERPL-MCNC: 7.2 G/DL (ref 6.3–8.2)
RBC # BLD AUTO: 5.2 M/UL (ref 4.05–5.2)
SODIUM SERPL-SCNC: 142 MMOL/L (ref 136–145)
TRIGL SERPL-MCNC: 71 MG/DL (ref 35–150)
VLDLC SERPL CALC-MCNC: 14.2 MG/DL (ref 6–23)
WBC # BLD AUTO: 4.7 K/UL (ref 4.3–11.1)

## 2022-09-08 NOTE — PROGRESS NOTES
SUBJECTIVE:   33 y.o.  female for annual checkup. 69-year-old  female comes in for cpe  1. Dizziness has improved overall. Still has some room spinning when turning head to right. prominent. Discussed benign positional vertigo and patient was given instructions on Epley Maneuver. 2.  Left thumb pain has improved. 3. Some painful Bms. Did have a tear while delivering. Does feel bump in anus. Around 2 weeks started hurting. Hurts worse when straining. Recently has had harder stools. 4. Last pap smear  normal.  5.  Hyperlipidemia. . Diet and appetite increased since breastfeeding. Not eating lots of fruits and veggies. Has recently started walking. 6.  Lab work done 2021 urinalysis normal TSH normal hemoglobin A1c normal cholesterol normal CMP normal CBC normal  Lab work done 2022 cholesterol 230 HDL 73  CMP within normal limits CBC within normal  7. Mood is good overall. Has few days here and there where agitated and frustrated and overwhelmed. Denies SI/HI. Feels well-supported at home. Current Outpatient Medications   Medication Sig Dispense Refill    hydrocortisone (ANUSOL-HC) 2.5 % CREA rectal cream Apply qhs as needed. 1 each 1    Tdap (ADACEL) 5-2-15.5 LF-MCG/0.5 injection Inject 0.5 mLs into the muscle once for 1 dose 0.5 mL 0    ibuprofen (ADVIL;MOTRIN) 800 MG tablet Take 1 tablet by mouth every 8 hours as needed for Pain 60 tablet 0    ferrous sulfate (IRON 325) 325 (65 Fe) MG tablet Take 325 mg by mouth daily (with breakfast)      Prenatal Vit-Fe Fumarate-FA (PRENATAL PO) Take by mouth      FreeStyle Lancets MISC 1 each by Does not apply route daily (Patient not taking: Reported on 9/15/2022) 120 each 3     No current facility-administered medications for this visit. Allergies: Nickel, Metronidazole, and Sulfa antibiotics   Patient's last menstrual period was 11/15/2021. ROS:  Feeling well. No dyspnea or chest pain on exertion.   No abdominal pain, some constipation, black or bloody stools. No urinary tract symptoms. GYN ROS: normal menses, no abnormal bleeding, pelvic pain or discharge, no breast pain or new or enlarging lumps on self exam. No neurological complaints. OBJECTIVE:   The patient appears well, alert, oriented x 3, in no distress. /82   Pulse 94   Temp 97.5 °F (36.4 °C) (Temporal)   Ht 5' 2\" (1.575 m)   Wt 143 lb 3.2 oz (65 kg)   LMP 11/15/2021   SpO2 98%   BMI 26.19 kg/m²   Neck supple. No adenopathy. Lungs are clear, good air entry, no wheezes, rhonchi or rales. S1 and S2 normal, no murmurs, regular rate and rhythm. Abdominal exam deferred due to patient breastfeeding during appointment. Extremities show no edema. BREAST EXAM: performed by specialist    PELVIC EXAM: examination not indicated    ASSESSMENT:   well woman    PLAN:  return annually or prn          ICD-10-CM    1. Annual physical exam  Z00.00       2. Rectal pain  K62.89 hydrocortisone (ANUSOL-HC) 2.5 % CREA rectal cream      3. Need for Tdap vaccination  Z23 Tdap (ADACEL) 5-2-15.5 LF-MCG/0.5 injection      4. Pure hypercholesterolemia  E78.00 Comprehensive Metabolic Panel     Lipid Panel        Annual CPE completed today with no abnormalities found. Patient notes pain during defecation for the past 2 weeks. Thinks she feels a painful bump in her anus. Discussed with her that hemorrhoids are common after delivery. Denies bloody stools. At this time, encouraged over-the-counter Colace as well as doing at home sitz bath's. Also prescribed Anusol rectal cream.  Patient to receive Tdap vaccine at pharmacy. Patient's total cholesterol has increased to 230 and LDL has increased to 142.8. At this time, encouraged low-carb low-cholesterol diet and gave patient some options for healthy snacks while breast-feeding. We will recheck a lipid panel and CMP in 6 months. Patient seen and examined with medical student.   Assessment and plan discussed with medical student and patient. Notes reviewed. Nurse Only on 09/08/2022   Component Date Value Ref Range Status    Cholesterol, Total 09/08/2022 230 (A) <200 MG/DL Final    Comment: Borderline High: 200-239 mg/dL  High: Greater than or equal to 240 mg/dL      Triglycerides 09/08/2022 71  35 - 150 MG/DL Final    Comment: Borderline High: 150-199 mg/dL, High: 200-499 mg/dL  Very High: Greater than or equal to 500 mg/dL      HDL 09/08/2022 73 (A) 40 - 60 MG/DL Final    LDL Calculated 09/08/2022 142.8 (A) <100 MG/DL Final    Comment: Near Optimal: 100-129 mg/dL  Borderline High: 130-159, High: 160-189 mg/dL  Very High: Greater than or equal to 190 mg/dL      VLDL Cholesterol Calculated 09/08/2022 14.2  6.0 - 23.0 MG/DL Final    Chol/HDL Ratio 09/08/2022 3.2    Final    Sodium 09/08/2022 142  136 - 145 mmol/L Final    Potassium 09/08/2022 4.0  3.5 - 5.1 mmol/L Final    Chloride 09/08/2022 109  101 - 110 mmol/L Final    CO2 09/08/2022 26  21 - 32 mmol/L Final    Anion Gap 09/08/2022 7  4 - 13 mmol/L Final    Glucose 09/08/2022 70  65 - 100 mg/dL Final    BUN 09/08/2022 8  6 - 23 MG/DL Final    Creatinine 09/08/2022 0.80  0.6 - 1.0 MG/DL Final    GFR  09/08/2022 >60  >60 ml/min/1.73m2 Final    GFR Non- 09/08/2022 >60  >60 ml/min/1.73m2 Final    Comment:   Estimated GFR is calculated using the Modification of Diet in Renal Disease (MDRD) Study equation, reported for both  Americans (GFRAA) and non- Americans (GFRNA), and normalized to 1.73m2 body surface area. The physician must decide which value applies to the patient. The MDRD study equation should only be used in individuals age 25 or older. It has not been validated for the following: pregnant women, patients with serious comorbid conditions,or on certain medications, or persons with extremes of body size, muscle mass, or nutritional status.       Calcium 09/08/2022 9.3  8.3 - 10.4 MG/DL Final    Total Bilirubin 09/08/2022 0.9  0.2 - 1.1 MG/DL Final    ALT 09/08/2022 29  12 - 65 U/L Final    AST 09/08/2022 14 (A) 15 - 37 U/L Final    Alk Phosphatase 09/08/2022 74  50 - 136 U/L Final    Total Protein 09/08/2022 7.2  6.3 - 8.2 g/dL Final    Albumin 09/08/2022 3.6  3.5 - 5.0 g/dL Final    Globulin 09/08/2022 3.6 (A) 2.3 - 3.5 g/dL Final    Albumin/Globulin Ratio 09/08/2022 1.0 (A) 1.2 - 3.5   Final    WBC 09/08/2022 4.7  4.3 - 11.1 K/uL Final    RBC 09/08/2022 5.20  4.05 - 5.2 M/uL Final    Hemoglobin 09/08/2022 13.9  11.7 - 15.4 g/dL Final    Hematocrit 09/08/2022 43.7  35.8 - 46.3 % Final    MCV 09/08/2022 84.0  79.6 - 97.8 FL Final    MCH 09/08/2022 26.7  26.1 - 32.9 PG Final    MCHC 09/08/2022 31.8  31.4 - 35.0 g/dL Final    RDW 09/08/2022 15.9 (A) 11.9 - 14.6 % Final    Platelets 73/37/5435 367  150 - 450 K/uL Final    MPV 09/08/2022 9.7  9.4 - 12.3 FL Final    nRBC 09/08/2022 0.00  0.0 - 0.2 K/uL Final    **Note: Absolute NRBC parameter is now reported with Hemogram**

## 2022-09-15 ENCOUNTER — OFFICE VISIT (OUTPATIENT)
Dept: INTERNAL MEDICINE CLINIC | Facility: CLINIC | Age: 32
End: 2022-09-15
Payer: MEDICAID

## 2022-09-15 VITALS
HEART RATE: 94 BPM | SYSTOLIC BLOOD PRESSURE: 118 MMHG | DIASTOLIC BLOOD PRESSURE: 82 MMHG | BODY MASS INDEX: 26.35 KG/M2 | WEIGHT: 143.2 LBS | HEIGHT: 62 IN | OXYGEN SATURATION: 98 % | TEMPERATURE: 97.5 F

## 2022-09-15 DIAGNOSIS — E78.00 PURE HYPERCHOLESTEROLEMIA: ICD-10-CM

## 2022-09-15 DIAGNOSIS — K62.89 RECTAL PAIN: ICD-10-CM

## 2022-09-15 DIAGNOSIS — Z23 NEED FOR TDAP VACCINATION: ICD-10-CM

## 2022-09-15 DIAGNOSIS — Z00.00 ANNUAL PHYSICAL EXAM: Primary | ICD-10-CM

## 2022-09-15 PROCEDURE — 99395 PREV VISIT EST AGE 18-39: CPT | Performed by: FAMILY MEDICINE

## 2022-09-15 RX ORDER — HYDROCORTISONE 25 MG/G
CREAM TOPICAL
Qty: 1 EACH | Refills: 1 | Status: SHIPPED | OUTPATIENT
Start: 2022-09-15 | End: 2022-10-25

## 2022-10-04 ENCOUNTER — POSTPARTUM VISIT (OUTPATIENT)
Dept: OBGYN CLINIC | Age: 32
End: 2022-10-04

## 2022-10-04 VITALS — BODY MASS INDEX: 25.61 KG/M2 | WEIGHT: 140 LBS

## 2022-10-05 ENCOUNTER — POSTPARTUM VISIT (OUTPATIENT)
Dept: OBGYN CLINIC | Age: 32
End: 2022-10-05
Payer: MEDICAID

## 2022-10-05 VITALS
BODY MASS INDEX: 25.76 KG/M2 | HEIGHT: 62 IN | SYSTOLIC BLOOD PRESSURE: 118 MMHG | WEIGHT: 140 LBS | DIASTOLIC BLOOD PRESSURE: 70 MMHG

## 2022-10-05 DIAGNOSIS — Z30.09 CONTRACEPTIVE EDUCATION: ICD-10-CM

## 2022-10-05 RX ORDER — NORETHINDRONE ACETATE AND ETHINYL ESTRADIOL 1MG-20(21)
1 KIT ORAL DAILY
Qty: 1 PACKET | Refills: 3 | Status: SHIPPED | OUTPATIENT
Start: 2022-10-05 | End: 2022-10-25

## 2022-10-05 ASSESSMENT — ENCOUNTER SYMPTOMS
EYES NEGATIVE: 1
GASTROINTESTINAL NEGATIVE: 1
ALLERGIC/IMMUNOLOGIC NEGATIVE: 1
RESPIRATORY NEGATIVE: 1

## 2022-10-05 NOTE — PROGRESS NOTES
Chief Complaint   Patient presents with    Postpartum Care     6 weeks (8/22/22) s/p baby girl \"Xolani\", who is breast fed. Pt c/o vaginal pressure    Contraception     Discuss options       Subjective: This 33 y.o. female presents today for a post-partum visit after vaginal delivery. Delivery Method: Vaginal delivery  Status of Baby: Disposition of baby: home. Baby is breast fed. Postpartum Contraception: none      History reviewed. No pertinent past medical history. Past Surgical History:   Procedure Laterality Date    GALLBLADDER SURGERY      VAGINAL DELIVERY  08/2022     Social History     Socioeconomic History    Marital status: Single     Spouse name: Not on file    Number of children: Not on file    Years of education: Not on file    Highest education level: Not on file   Occupational History    Not on file   Tobacco Use    Smoking status: Never    Smokeless tobacco: Never   Vaping Use    Vaping Use: Never used   Substance and Sexual Activity    Alcohol use: Not Currently    Drug use: Never    Sexual activity: Yes     Partners: Male   Other Topics Concern    Not on file   Social History Narrative    Not on file     Social Determinants of Health     Financial Resource Strain: Not on file   Food Insecurity: Not on file   Transportation Needs: Not on file   Physical Activity: Not on file   Stress: Not on file   Social Connections: Not on file   Intimate Partner Violence: Not on file   Housing Stability: Not on file     /70   Ht 5' 2\" (1.575 m)   Wt 140 lb (63.5 kg)   LMP 11/15/2021   Breastfeeding No   BMI 25.61 kg/m²      Review of Systems   Constitutional: Negative. HENT: Negative. Eyes: Negative. Respiratory: Negative. Cardiovascular: Negative. Gastrointestinal: Negative. Endocrine: Negative. Genitourinary:         Vaginal pressure   Musculoskeletal: Negative. Allergic/Immunologic: Negative. Neurological: Negative. Hematological: Negative. Psychiatric/Behavioral: Negative. Physical Exam  Vitals signs reviewed. Constitutional:       General: She is not in acute distress. Appearance: Normal appearance. She is well-developed and normal weight. She is not ill-appearing, toxic-appearing or diaphoretic. HENT:      Head: Normocephalic and atraumatic. Eyes:      General: No scleral icterus. Conjunctiva/sclera: Conjunctivae normal.      Pupils: Pupils are equal, round, and reactive to light. Neck:      Musculoskeletal: Normal range of motion and neck supple. Thyroid: No thyromegaly. Vascular: No JVD. Trachea: No tracheal deviation. Cardiovascular:      Rate and Rhythm: Normal rate and regular rhythm. Heart sounds: Normal heart sounds. No murmur. No friction rub. No gallop. Pulmonary:      Effort: Pulmonary effort is normal. No respiratory distress. Breath sounds: Normal breath sounds. No stridor. No wheezing, rhonchi or rales. Chest:      Chest wall: No tenderness. Breasts:         Right: No inverted nipple, mass, nipple discharge, skin change or tenderness. Left: No inverted nipple, mass, nipple discharge, skin change or tenderness. Abdominal:      General: Bowel sounds are normal. There is no distension. Palpations: Abdomen is soft. There is no mass. Tenderness: There is no abdominal tenderness. There is no guarding or rebound. Genitourinary:     Labia:         Right: No rash, tenderness, lesion or injury. Left: No rash, tenderness, lesion or injury. Vagina: Normal. No signs of injury and foreign body. No vaginal discharge, erythema, tenderness or bleeding. Cervix: No cervical motion tenderness, discharge or friability. Uterus: Not enlarged and not tender. Adnexa:         Right: No mass, tenderness or fullness. Left: No mass, tenderness or fullness. Comments: External genitalia are normal in appearance.     Examination of urethral meatus reveals location normal and size normal.   Examination of urethra shows no abnormalities. Examination of vaginal vault reveals no abnormalities. Cervix is nontender on exam  Uterine portion of bimanual exam reveals contour normal, nontender. Adnexa and parametria exam reveals no masses, nontender. Visual examination of anus and perineum shows no abnormalities. Musculoskeletal: Normal range of motion. General: No tenderness. Lymphadenopathy:      Cervical: No cervical adenopathy. Upper Body:      Right upper body: No supraclavicular adenopathy. Left upper body: No supraclavicular adenopathy. Lower Body: No right inguinal adenopathy. No left inguinal adenopathy. Skin:     General: Skin is warm and dry. Coloration: Skin is not pale. Findings: No erythema or rash. Neurological:      Mental Status: She is alert and oriented to person, place, and time. Cranial Nerves: No cranial nerve deficit. Motor: No abnormal muscle tone. Coordination: Coordination normal.      Deep Tendon Reflexes: Reflexes are normal and symmetric. Psychiatric:         Behavior: Behavior normal.         Thought Content: Thought content normal.         Judgment: Judgment normal.         1. 6 weeks postpartum follow-up    2. Postpartum examination following vaginal delivery    3. Contraceptive education      No orders of the defined types were placed in this encounter. Orders Placed This Encounter   Medications    norethindrone-ethinyl estradiol (LOESTRIN FE 1/20) 1-20 MG-MCG per tablet     Sig: Take 1 tablet by mouth daily     Dispense:  1 packet     Refill:  3       Doing well postpartum. Denies pp depression. Denies mastitis. Contraceptive counseling discussed. Benefits of LARCs reviewed, although r/b/a to each option was discussed. Pt considering nexplanon versus an IUD, pamphlets given and pt will consider options.  Encouraged her to sign form at checkout to be able to price the device for her. Will return for placement once settled on a method. Pt had questions regarding what type of tear she had at delivery - records reviewed in detail and pt had a 1st degree lac repair which was noted to be well healed on today's exam.        Return if symptoms worsen or fail to improve, for asap for IUD insertion (sign form at check out).

## 2022-10-25 ENCOUNTER — TELEMEDICINE (OUTPATIENT)
Dept: INTERNAL MEDICINE CLINIC | Facility: CLINIC | Age: 32
End: 2022-10-25
Payer: MEDICAID

## 2022-10-25 DIAGNOSIS — J06.9 UPPER RESPIRATORY TRACT INFECTION, UNSPECIFIED TYPE: Primary | ICD-10-CM

## 2022-10-25 PROCEDURE — 99213 OFFICE O/P EST LOW 20 MIN: CPT | Performed by: FAMILY MEDICINE

## 2022-10-25 RX ORDER — AMOXICILLIN 875 MG/1
875 TABLET, COATED ORAL 2 TIMES DAILY
Qty: 20 TABLET | Refills: 0 | Status: SHIPPED | OUTPATIENT
Start: 2022-10-25 | End: 2022-11-04

## 2022-10-25 SDOH — ECONOMIC STABILITY: FOOD INSECURITY: WITHIN THE PAST 12 MONTHS, YOU WORRIED THAT YOUR FOOD WOULD RUN OUT BEFORE YOU GOT MONEY TO BUY MORE.: NEVER TRUE

## 2022-10-25 SDOH — ECONOMIC STABILITY: FOOD INSECURITY: WITHIN THE PAST 12 MONTHS, THE FOOD YOU BOUGHT JUST DIDN'T LAST AND YOU DIDN'T HAVE MONEY TO GET MORE.: NEVER TRUE

## 2022-10-25 ASSESSMENT — PATIENT HEALTH QUESTIONNAIRE - PHQ9
DEPRESSION UNABLE TO ASSESS: PT REFUSES
SUM OF ALL RESPONSES TO PHQ QUESTIONS 1-9: 0
1. LITTLE INTEREST OR PLEASURE IN DOING THINGS: 0
2. FEELING DOWN, DEPRESSED OR HOPELESS: 0
SUM OF ALL RESPONSES TO PHQ QUESTIONS 1-9: 0
SUM OF ALL RESPONSES TO PHQ9 QUESTIONS 1 & 2: 0
SUM OF ALL RESPONSES TO PHQ QUESTIONS 1-9: 0
SUM OF ALL RESPONSES TO PHQ QUESTIONS 1-9: 0

## 2022-10-25 ASSESSMENT — SOCIAL DETERMINANTS OF HEALTH (SDOH): HOW HARD IS IT FOR YOU TO PAY FOR THE VERY BASICS LIKE FOOD, HOUSING, MEDICAL CARE, AND HEATING?: NOT HARD AT ALL

## 2022-10-25 NOTE — PROGRESS NOTES
Leslie Herrera (:  1990) is a Established patient, here for evaluation of the following:  Sore throat of 4 days duration  Assessment & Plan   Below is the assessment and plan developed based on review of pertinent history, physical exam, labs, studies, and medications. 1. Upper respiratory tract infection, unspecified type  -     amoxicillin (AMOXIL) 875 MG tablet; Take 1 tablet by mouth 2 times daily for 10 days, Disp-20 tablet, R-0Normal  1. URI probably viral.  Supportive treatment with salt water gargle, over-the-counter medications. Only take antibiotics if not better in 3 days. Return if symptoms worsen or fail to improve. Subjective   71-year-old female comes in because of sore throat since 10/21/2022. + fever no chills. Throat better. Voice better. Dry cough. No sob. No runny nose. Feels better.  Negative covid test.     Review of Systems       Objective   Patient-Reported Vitals  BP Observations: No, remote/electronic monitoring device was not used or able to be verified       Physical Exam  [INSTRUCTIONS:  \"[x]\" Indicates a positive item  \"[]\" Indicates a negative item  -- DELETE ALL ITEMS NOT EXAMINED]    Constitutional: [x] Appears well-developed and well-nourished [x] No apparent distress      [] Abnormal -     Mental status: [x] Alert and awake  [x] Oriented to person/place/time [x] Able to follow commands    [] Abnormal -     Eyes:   EOM    [x]  Normal    [] Abnormal -   Sclera  [x]  Normal    [] Abnormal -          Discharge [x]  None visible   [] Abnormal -     HENT: [x] Normocephalic, atraumatic  [] Abnormal -   [x] Mouth/Throat: Mucous membranes are moist    External Ears [x] Normal  [] Abnormal -    Neck: [x] No visualized mass [] Abnormal -     Pulmonary/Chest: [x] Respiratory effort normal   [x] No visualized signs of difficulty breathing or respiratory distress        [] Abnormal -      Musculoskeletal:   [x] Normal gait with no signs of ataxia         [x] Normal range of motion of neck        [] Abnormal -     Neurological:        [x] No Facial Asymmetry (Cranial nerve 7 motor function) (limited exam due to video visit)          [x] No gaze palsy        [] Abnormal -          Skin:        [x] No significant exanthematous lesions or discoloration noted on facial skin         [] Abnormal -            Psychiatric:       [x] Normal Affect [] Abnormal -        [x] No Hallucinations    Other pertinent observable physical exam findings:-             Leslie Herrera, was evaluated through a synchronous (real-time) audio-video encounter. The patient (or guardian if applicable) is aware that this is a billable service, which includes applicable co-pays. This Virtual Visit was conducted with patient's (and/or legal guardian's) consent. The visit was conducted pursuant to the emergency declaration under the [facility], [id number] waiver authority and the [employer] and [employer] Act. Patient identification was verified, and a caregiver was present when appropriate. The patient was located at Home: [address] [facility] [id number]. Provider was located at [facility] (Appt Dept): [address] [place] [address],  [address]         --Shayne Metcalf MD

## 2022-11-28 ENCOUNTER — NURSE ONLY (OUTPATIENT)
Dept: INTERNAL MEDICINE CLINIC | Facility: CLINIC | Age: 32
End: 2022-11-28

## 2022-11-28 DIAGNOSIS — Z11.1 SCREENING EXAMINATION FOR PULMONARY TUBERCULOSIS: Primary | ICD-10-CM

## 2022-11-30 ENCOUNTER — NURSE ONLY (OUTPATIENT)
Dept: INTERNAL MEDICINE CLINIC | Facility: CLINIC | Age: 32
End: 2022-11-30

## 2022-11-30 DIAGNOSIS — Z11.1 SCREENING EXAMINATION FOR PULMONARY TUBERCULOSIS: Primary | ICD-10-CM

## 2022-11-30 LAB
MM INDURATION, POC: 0 MM (ref 0–5)
PPD, POC: NEGATIVE

## 2023-01-05 ENCOUNTER — NURSE TRIAGE (OUTPATIENT)
Dept: OTHER | Facility: CLINIC | Age: 33
End: 2023-01-05

## 2023-01-05 NOTE — TELEPHONE ENCOUNTER
Location of patient: [place]    Received call from Lelo kenneth at [id number] with [employer]. Subjective: Caller states \"I am having some SOB, I have to take a deep breath to get back on rhythm with my breath in. I have to cough to get back on track. \"     Current Symptoms: Extra effort to take deep breath. Was able to lay flat to sleep. NO wheezing or chest pain    No runny or stuffy nose. Onset: 2 days ago; unchanged    Associated Symptoms: NA    Pain Severity: 0/10; N/A; none    Temperature: none     What has been tried: stock's, tea and estefany    LMP: NA Pregnant: No    Recommended disposition: See in Office Today    Care advice provided, patient verbalizes understanding; denies any other questions or concerns; instructed to call back for any new or worsening symptoms. Patient/Caller agrees with recommended disposition; writer provided warm transfer to [employer] at [id number] for appointment scheduling    Attention Provider: Thank you for allowing me to participate in the care of your patient. The patient was connected to triage in response to information provided to the ECC/PSC. Please do not respond through this encounter as the response is not directed to a shared pool.       Reason for Disposition   Patient wants to be seen    Protocols used: Breathing Difficulty-ADULT-[place]

## 2023-02-06 ENCOUNTER — OFFICE VISIT (OUTPATIENT)
Dept: INTERNAL MEDICINE CLINIC | Facility: CLINIC | Age: 33
End: 2023-02-06
Payer: MEDICAID

## 2023-02-06 VITALS
OXYGEN SATURATION: 97 % | DIASTOLIC BLOOD PRESSURE: 67 MMHG | HEIGHT: 62 IN | SYSTOLIC BLOOD PRESSURE: 103 MMHG | WEIGHT: 144.2 LBS | HEART RATE: 87 BPM | BODY MASS INDEX: 26.54 KG/M2

## 2023-02-06 DIAGNOSIS — E78.00 PURE HYPERCHOLESTEROLEMIA: ICD-10-CM

## 2023-02-06 DIAGNOSIS — U09.9 POST COVID-19 CONDITION, UNSPECIFIED: Primary | ICD-10-CM

## 2023-02-06 DIAGNOSIS — H81.10 BENIGN PAROXYSMAL POSITIONAL VERTIGO, UNSPECIFIED LATERALITY: ICD-10-CM

## 2023-02-06 DIAGNOSIS — Z13.1 SCREENING FOR DIABETES MELLITUS: ICD-10-CM

## 2023-02-06 DIAGNOSIS — Z13.6 SCREENING, ISCHEMIC HEART DISEASE: ICD-10-CM

## 2023-02-06 DIAGNOSIS — K64.9 HEMORRHOIDS, UNSPECIFIED HEMORRHOID TYPE: ICD-10-CM

## 2023-02-06 PROCEDURE — 99214 OFFICE O/P EST MOD 30 MIN: CPT | Performed by: FAMILY MEDICINE

## 2023-02-06 RX ORDER — ALBUTEROL SULFATE 90 UG/1
2 AEROSOL, METERED RESPIRATORY (INHALATION) 4 TIMES DAILY PRN
Qty: 54 G | Refills: 1 | Status: SHIPPED | OUTPATIENT
Start: 2023-02-06

## 2023-02-06 ASSESSMENT — ENCOUNTER SYMPTOMS
COUGH: 1
DIARRHEA: 0
SHORTNESS OF BREATH: 1
WHEEZING: 0
BLOOD IN STOOL: 0
SORE THROAT: 0
NAUSEA: 0
CONSTIPATION: 0
VOMITING: 0
RECTAL PAIN: 0
CHEST TIGHTNESS: 1
ABDOMINAL PAIN: 0
BACK PAIN: 0

## 2023-02-06 NOTE — PROGRESS NOTES
Abdi Figueroa (:  1990) is a 33 y.o. female,Established patient, here for evaluation of the following chief complaint(s):  Chills (Intermittently since December. Happens at night and she had cough and SOB. Was seen at  last month. Was given a Z kvng but did not take it due to breastfeeding )         ASSESSMENT/PLAN:  1. Post covid-19 condition, unspecified  -     albuterol sulfate HFA (VENTOLIN HFA) 108 (90 Base) MCG/ACT inhaler; Inhale 2 puffs into the lungs 4 times daily as needed for Wheezing, Disp-54 g, R-1Normal  2. Benign paroxysmal positional vertigo, unspecified laterality  3. Hemorrhoids, unspecified hemorrhoid type  4. Screening, ischemic heart disease  -     Lipid Panel; Future  5. Screening for diabetes mellitus  -     Comprehensive Metabolic Panel; Future  6. Pure hypercholesterolemia    Return in about 2 weeks (around 2023) for ffup with labs prior to visit. 1) discussed symptoms as stated in subjective are most likely due to post-covid syndrome. Patient instructed to take Z-pack she did not start from urgent care. Ordered PRN albuterol. Patient to note how often she is taking the inhaler. 2) patient encouraged to continue use of epley maneuver  3) Patient encourgaed to maintain fiber intake to prevent constipation which exacerbates hemorrhoids  4/5) ordered CMP and Lipid panel  6. Hyperlipidemia. Patient trying to follow diet. We will repeat lipid profile. Patient seen and examined with medical student. Assessment and plan discussed with medical student with patient.   Subjective   SUBJECTIVE/OBJECTIVE:  Post Covid Syndrome: Since november persistent chills and coughing fits in the middle of the night; SOB with increased work of breathing; not well rested  -EmergencyMD; took a negative chest X-ray-  approximately did not take Z pack  -Chest Xray was negative  -SOB when standing up has been occurring since yesterday morning  -Discussed potential of DVTs due to recent decline in activity and recent birth  -Discussed her past history of bronchitis    Has been having difficulties due to to breast feeding, intends to breast feed as long as she can   -incorporating fruits and vegetables, struggling with sweets and chick williams a  -recommended protein powder   - has not resumed menstruation due to breast feeding    Activity Level: in the process of moving; recommended walking workouts; moved to a place with a gym    Left thumb pain has ceased     Dizziness has subsided- occasional vertigo every few, episodes are less than a month    Hemorrhoids has ceased mainly- only occur with constipation which occurs once a month    Hyperlipidemia. . Patient on diet. We will repeat lipid profile. Social: no caffeine, no smoking, no alcohol use          Review of Systems   Constitutional:  Positive for chills and fatigue. Negative for fever. HENT:  Negative for congestion, hearing loss, sneezing and sore throat. Eyes:  Negative for visual disturbance. Respiratory:  Positive for cough, chest tightness and shortness of breath. Negative for wheezing. Yellow phelgm with cough since december   Cardiovascular:  Negative for chest pain. Gastrointestinal:  Negative for abdominal pain, blood in stool, constipation, diarrhea, nausea, rectal pain and vomiting. Endocrine: Negative. Genitourinary: Negative. Musculoskeletal:  Positive for myalgias. Negative for arthralgias and back pain. Myalgias with the chills   Neurological:  Negative for light-headedness, numbness and headaches. Psychiatric/Behavioral:  The patient is nervous/anxious. Anxiety in conjunction with moving and work        Objective   Physical Exam  Constitutional:       Appearance: Normal appearance. She is normal weight. HENT:      Head: Normocephalic and atraumatic.       Nose: Nose normal.      Mouth/Throat:      Mouth: Mucous membranes are moist.   Eyes:      Extraocular Movements: Extraocular movements intact. Conjunctiva/sclera: Conjunctivae normal.      Pupils: Pupils are equal, round, and reactive to light. Cardiovascular:      Rate and Rhythm: Normal rate and regular rhythm. Pulses: Normal pulses. Heart sounds: Normal heart sounds. Pulmonary:      Effort: Pulmonary effort is normal.      Breath sounds: Normal breath sounds. Abdominal:      Palpations: Abdomen is soft. Musculoskeletal:         General: Normal range of motion. Cervical back: Normal range of motion. Comments: Negative Jerry's test   Skin:     General: Skin is warm and dry. Neurological:      General: No focal deficit present. Mental Status: She is alert and oriented to person, place, and time. Mental status is at baseline. Psychiatric:         Mood and Affect: Mood normal.         Behavior: Behavior normal.         Thought Content: Thought content normal.                An electronic signature was used to authenticate this note.     --Juan Francisco Li MD

## 2023-02-06 NOTE — PROGRESS NOTES
71-year-old  female comes in for 6 month ffup  1. Dizziness has improved overall. Still has some room spinning when turning head to right. prominent. Discussed benign positional vertigo and patient was given instructions on Epley Maneuver. 2.  Left thumb pain has improved. 3. Some painful Bms. Did have a tear while delivering. Does feel bump in anus. Around 2 weeks started hurting. Hurts worse when straining. Recently has had harder stools. 4. Last pap smear  normal.  5.  Hyperlipidemia. . Diet and appetite increased since breastfeeding. Not eating lots of fruits and veggies. Has recently started walking. 6.  Lab work done 2021 urinalysis normal TSH normal hemoglobin A1c normal cholesterol normal CMP normal CBC normal  Lab work done 2022 cholesterol 230 HDL 73  CMP within normal limits CBC within normal  7. Mood is good overall. Has few days here and there where agitated and frustrated and overwhelmed. Denies SI/HI. Feels well-supported at home. Nurse Only on 2022   Component Date Value Ref Range Status    PPD, (POC) 2022 Negative  Negative Final    mm Induration 2022 0  0 - 5 mm Final       Annual CPE completed today with no abnormalities found. Patient notes pain during defecation for the past 2 weeks. Thinks she feels a painful bump in her anus. Discussed with her that hemorrhoids are common after delivery. Denies bloody stools. At this time, encouraged over-the-counter Colace as well as doing at home sitz bath's. Also prescribed Anusol rectal cream.  Patient to receive Tdap vaccine at pharmacy. Patient's total cholesterol has increased to 230 and LDL has increased to 142.8. At this time, encouraged low-carb low-cholesterol diet and gave patient some options for healthy snacks while breast-feeding. We will recheck a lipid panel and CMP in 6 months. Patient seen and examined with medical student.   Assessment and plan discussed with medical student and patient. Notes reviewed.

## 2023-02-16 ENCOUNTER — NURSE ONLY (OUTPATIENT)
Dept: INTERNAL MEDICINE CLINIC | Facility: CLINIC | Age: 33
End: 2023-02-16

## 2023-02-16 DIAGNOSIS — Z13.1 SCREENING FOR DIABETES MELLITUS: ICD-10-CM

## 2023-02-16 DIAGNOSIS — Z13.6 SCREENING, ISCHEMIC HEART DISEASE: ICD-10-CM

## 2023-02-16 LAB
ALBUMIN SERPL-MCNC: 3.8 G/DL (ref 3.5–5)
ALBUMIN/GLOB SERPL: 1 (ref 0.4–1.6)
ALP SERPL-CCNC: 63 U/L (ref 50–136)
ALT SERPL-CCNC: 30 U/L (ref 12–65)
ANION GAP SERPL CALC-SCNC: 5 MMOL/L (ref 2–11)
AST SERPL-CCNC: 9 U/L (ref 15–37)
BILIRUB SERPL-MCNC: 0.4 MG/DL (ref 0.2–1.1)
BUN SERPL-MCNC: 7 MG/DL (ref 6–23)
CALCIUM SERPL-MCNC: 9.1 MG/DL (ref 8.3–10.4)
CHLORIDE SERPL-SCNC: 108 MMOL/L (ref 101–110)
CHOLEST SERPL-MCNC: 191 MG/DL
CO2 SERPL-SCNC: 28 MMOL/L (ref 21–32)
CREAT SERPL-MCNC: 0.8 MG/DL (ref 0.6–1)
GLOBULIN SER CALC-MCNC: 3.7 G/DL (ref 2.8–4.5)
GLUCOSE SERPL-MCNC: 90 MG/DL (ref 65–100)
HDLC SERPL-MCNC: 62 MG/DL (ref 40–60)
HDLC SERPL: 3.1
LDLC SERPL CALC-MCNC: 112.4 MG/DL
POTASSIUM SERPL-SCNC: 3.6 MMOL/L (ref 3.5–5.1)
PROT SERPL-MCNC: 7.5 G/DL (ref 6.3–8.2)
SODIUM SERPL-SCNC: 141 MMOL/L (ref 133–143)
TRIGL SERPL-MCNC: 83 MG/DL (ref 35–150)
VLDLC SERPL CALC-MCNC: 16.6 MG/DL (ref 6–23)

## 2023-02-16 NOTE — PROGRESS NOTES
Norma Mota (:  1990) is a 33 y.o. female,Established patient, here for evaluation of the following chief complaint(s):  Follow-up (6 month f/u-lab review. )         ASSESSMENT/PLAN:  1. Post covid-19 condition, unspecified  2. Pure hypercholesterolemia  -     Comprehensive Metabolic Panel; Future  -     Lipid Panel; Future  3. Need for Tdap vaccination  -     Tdap (ADACEL) 5-2-15.5 LF-MCG/0.5 injection; Inject 0.5 mLs into the muscle once for 1 dose, Disp-0.5 mL, R-0Normal  4. Screening for diabetes mellitus  -     Comprehensive Metabolic Panel; Future  -     Urinalysis with Reflex to Culture; Future  -     Hemoglobin A1C; Future  5. Screening for thyroid disorder  -     TSH with Reflex; Future  6. Screening, ischemic heart disease  -     CBC with Auto Differential; Future  -     Comprehensive Metabolic Panel; Future  -     Lipid Panel; Future  1. Post-COVID shortness of breath appears to be resolved at this time. 2.  Hyperlipidemia. Cholesterol much improved with diet. Still has to try to avoid fried food. Exercising more. 3.  Schedule for complete physical exam on next visit. Labs ordered  Return in about 30 weeks (around 2023) for ffup for cpe with labs prior. Subjective   SUBJECTIVE/OBJECTIVE:  71-year-old female in for follow-up of  1. Post Covid Syndrome. Patient treated with for infection with Z-Андрей. Patient given inhaler for shortness of breath. Did well with inhaler. Has stopped using and doing well. 2.  Has continued to breast feed. No menses at this time. 3.  Working out more. 4.  Left thumb pain has ceased   5. Dizziness improved. Better with hydration. 6.  Hemorrhoids stable. 7.  Hyperlipidemia. . Patient on diet. We will repeat lipid profile. Watching diet more. Has to decrease fried foods.    8.  Lab work done 2023 HDL 62  GFR normal LFT normal    Nurse Only on 2023   Component Date Value Ref Range Status    Cholesterol, Total 02/16/2023 191  <200 MG/DL Final    Comment: Borderline High: 200-239 mg/dL  High: Greater than or equal to 240 mg/dL      Triglycerides 02/16/2023 83  35 - 150 MG/DL Final    Comment: Borderline High: 150-199 mg/dL, High: 200-499 mg/dL  Very High: Greater than or equal to 500 mg/dL      HDL 02/16/2023 62 (A)  40 - 60 MG/DL Final    LDL Calculated 02/16/2023 112.4 (A)  <100 MG/DL Final    Comment: Near Optimal: 100-129 mg/dL  Borderline High: 130-159, High: 160-189 mg/dL  Very High: Greater than or equal to 190 mg/dL      VLDL Cholesterol Calculated 02/16/2023 16.6  6.0 - 23.0 MG/DL Final    Chol/HDL Ratio 02/16/2023 3.1    Final    Sodium 02/16/2023 141  133 - 143 mmol/L Final    Potassium 02/16/2023 3.6  3.5 - 5.1 mmol/L Final    Chloride 02/16/2023 108  101 - 110 mmol/L Final    CO2 02/16/2023 28  21 - 32 mmol/L Final    Anion Gap 02/16/2023 5  2 - 11 mmol/L Final    Glucose 02/16/2023 90  65 - 100 mg/dL Final    BUN 02/16/2023 7  6 - 23 MG/DL Final    Creatinine 02/16/2023 0.80  0.6 - 1.0 MG/DL Final    Est, Glom Filt Rate 02/16/2023 >60  >60 ml/min/1.73m2 Final    Comment:   Pediatric calculator link: [facility].at. org/professionals/kdoqi/gfr_calculatorped    These results are not intended for use in patients <25years of age. eGFR results are calculated without a race factor using  the 2021 CKD-EPI equation. Careful clinical correlation is recommended, particularly when comparing to results calculated using previous equations. The CKD-EPI equation is less accurate in patients with extremes of muscle mass, extra-renal metabolism of creatinine, excessive creatine ingestion, or following therapy that affects renal tubular secretion.       Calcium 02/16/2023 9.1  8.3 - 10.4 MG/DL Final    Total Bilirubin 02/16/2023 0.4  0.2 - 1.1 MG/DL Final    ALT 02/16/2023 30  12 - 65 U/L Final    AST 02/16/2023 9 (A)  15 - 37 U/L Final    Alk Phosphatase 02/16/2023 63  50 - 136 U/L Final    Total Protein 02/16/2023 7.5 6.3 - 8.2 g/dL Final    Albumin 02/16/2023 3.8  3.5 - 5.0 g/dL Final    Globulin 02/16/2023 3.7  2.8 - 4.5 g/dL Final    Albumin/Globulin Ratio 02/16/2023 1.0  0.4 - 1.6   Final       Review of Systems       Objective   Physical Exam  Constitutional:       Appearance: Normal appearance. She is normal weight. HENT:      Head: Normocephalic. Neck:      Vascular: No carotid bruit. Cardiovascular:      Rate and Rhythm: Normal rate and regular rhythm. Heart sounds: Normal heart sounds. Pulmonary:      Effort: Pulmonary effort is normal.      Breath sounds: Normal breath sounds. Abdominal:      General: Abdomen is flat. Bowel sounds are normal.      Palpations: Abdomen is soft. Musculoskeletal:      Cervical back: No tenderness. Right lower leg: No edema. Left lower leg: No edema. Lymphadenopathy:      Cervical: No cervical adenopathy. Neurological:      General: No focal deficit present. Mental Status: She is alert. Psychiatric:         Mood and Affect: Mood normal.                An electronic signature was used to authenticate this note.     --Colette Lima MD

## 2023-03-02 ENCOUNTER — OFFICE VISIT (OUTPATIENT)
Dept: INTERNAL MEDICINE CLINIC | Facility: CLINIC | Age: 33
End: 2023-03-02
Payer: MEDICAID

## 2023-03-02 VITALS
DIASTOLIC BLOOD PRESSURE: 75 MMHG | OXYGEN SATURATION: 98 % | WEIGHT: 144.8 LBS | HEIGHT: 62 IN | BODY MASS INDEX: 26.65 KG/M2 | HEART RATE: 79 BPM | SYSTOLIC BLOOD PRESSURE: 108 MMHG

## 2023-03-02 DIAGNOSIS — Z23 NEED FOR TDAP VACCINATION: ICD-10-CM

## 2023-03-02 DIAGNOSIS — Z13.6 SCREENING, ISCHEMIC HEART DISEASE: ICD-10-CM

## 2023-03-02 DIAGNOSIS — E78.00 PURE HYPERCHOLESTEROLEMIA: ICD-10-CM

## 2023-03-02 DIAGNOSIS — Z13.1 SCREENING FOR DIABETES MELLITUS: ICD-10-CM

## 2023-03-02 DIAGNOSIS — U09.9 POST COVID-19 CONDITION, UNSPECIFIED: Primary | ICD-10-CM

## 2023-03-02 DIAGNOSIS — Z13.29 SCREENING FOR THYROID DISORDER: ICD-10-CM

## 2023-03-02 PROCEDURE — 99214 OFFICE O/P EST MOD 30 MIN: CPT | Performed by: FAMILY MEDICINE

## 2023-03-02 SDOH — ECONOMIC STABILITY: FOOD INSECURITY: WITHIN THE PAST 12 MONTHS, YOU WORRIED THAT YOUR FOOD WOULD RUN OUT BEFORE YOU GOT MONEY TO BUY MORE.: NEVER TRUE

## 2023-03-02 SDOH — ECONOMIC STABILITY: FOOD INSECURITY: WITHIN THE PAST 12 MONTHS, THE FOOD YOU BOUGHT JUST DIDN'T LAST AND YOU DIDN'T HAVE MONEY TO GET MORE.: NEVER TRUE

## 2023-03-02 SDOH — ECONOMIC STABILITY: INCOME INSECURITY: HOW HARD IS IT FOR YOU TO PAY FOR THE VERY BASICS LIKE FOOD, HOUSING, MEDICAL CARE, AND HEATING?: SOMEWHAT HARD

## 2023-03-02 SDOH — ECONOMIC STABILITY: HOUSING INSECURITY
IN THE LAST 12 MONTHS, WAS THERE A TIME WHEN YOU DID NOT HAVE A STEADY PLACE TO SLEEP OR SLEPT IN A SHELTER (INCLUDING NOW)?: NO

## 2023-08-24 ENCOUNTER — TELEPHONE (OUTPATIENT)
Dept: OBGYN CLINIC | Age: 33
End: 2023-08-24

## 2023-08-24 ENCOUNTER — OFFICE VISIT (OUTPATIENT)
Dept: OBGYN CLINIC | Age: 33
End: 2023-08-24
Payer: MEDICAID

## 2023-08-24 VITALS
BODY MASS INDEX: 29.44 KG/M2 | WEIGHT: 160 LBS | HEIGHT: 62 IN | DIASTOLIC BLOOD PRESSURE: 82 MMHG | SYSTOLIC BLOOD PRESSURE: 118 MMHG

## 2023-08-24 DIAGNOSIS — R30.9 PAINFUL URINATION: Primary | ICD-10-CM

## 2023-08-24 DIAGNOSIS — N89.8 VAGINAL DISCHARGE: ICD-10-CM

## 2023-08-24 DIAGNOSIS — N76.0 ACUTE VAGINITIS: ICD-10-CM

## 2023-08-24 LAB
BILIRUBIN, URINE, POC: NEGATIVE
BLOOD URINE, POC: ABNORMAL
GLUCOSE URINE, POC: NEGATIVE
KETONES, URINE, POC: NEGATIVE
LEUKOCYTE ESTERASE, URINE, POC: NEGATIVE
NITRITE, URINE, POC: NEGATIVE
PH, URINE, POC: 6 (ref 4.6–8)
PROTEIN,URINE, POC: NEGATIVE
SPECIFIC GRAVITY, URINE, POC: 1.02 (ref 1–1.03)
URINALYSIS CLARITY, POC: CLEAR
URINALYSIS COLOR, POC: YELLOW
UROBILINOGEN, POC: NORMAL

## 2023-08-24 PROCEDURE — 81003 URINALYSIS AUTO W/O SCOPE: CPT | Performed by: OBSTETRICS & GYNECOLOGY

## 2023-08-24 PROCEDURE — 99213 OFFICE O/P EST LOW 20 MIN: CPT | Performed by: OBSTETRICS & GYNECOLOGY

## 2023-08-24 RX ORDER — CLINDAMYCIN PHOSPHATE 100 MG/5G
1 CREAM VAGINAL ONCE
Qty: 5 G | Refills: 0 | Status: SHIPPED | OUTPATIENT
Start: 2023-08-24 | End: 2023-08-24

## 2023-08-24 NOTE — PROGRESS NOTES
Patient presents today for   Chief Complaint   Patient presents with    Urinary Tract Infection     Some painful urination       LMP: Patient's last menstrual period was 08/11/2023. Contraception: none        Allergies   Allergen Reactions    Nickel Rash    Metronidazole Other (See Comments)     Lip swelling    Sulfa Antibiotics Other (See Comments)     \"I couldn't walk\"  \"I couldn't walk\"     Current Outpatient Medications   Medication Sig Dispense Refill    ferrous sulfate (IRON 325) 325 (65 Fe) MG tablet Take 325 mg by mouth daily (with breakfast) (Patient not taking: Reported on 8/24/2023)      Prenatal Vit-Fe Fumarate-FA (PRENATAL PO) Take by mouth (Patient not taking: Reported on 8/24/2023)       No current facility-administered medications for this visit. History reviewed. No pertinent past medical history. Past Surgical History:   Procedure Laterality Date    GALLBLADDER SURGERY      VAGINAL DELIVERY  08/2022     Social History     Socioeconomic History    Marital status: Single     Spouse name: Not on file    Number of children: Not on file    Years of education: Not on file    Highest education level: Not on file   Occupational History    Not on file   Tobacco Use    Smoking status: Never    Smokeless tobacco: Never   Vaping Use    Vaping Use: Never used   Substance and Sexual Activity    Alcohol use: Not Currently    Drug use: Never    Sexual activity: Yes     Partners: Male   Other Topics Concern    Not on file   Social History Narrative    Abuse: Feels safe at home, no history of physical abuse, no history of sexual abuse      Social Determinants of Health     Financial Resource Strain: Medium Risk    Difficulty of Paying Living Expenses: Somewhat hard   Food Insecurity: No Food Insecurity    Worried About Running Out of Food in the Last Year: Never true    Ran Out of Food in the Last Year: Never true   Transportation Needs: Unknown    Lack of Transportation (Medical):  Not on file    Lack of

## 2023-08-24 NOTE — TELEPHONE ENCOUNTER
Pt LM on nurse line c/o UTI symptoms - requesting appt. Attempted to call pt back at number on file, went straight to . LM for pt to RTC to schedule appt for this afternoon with Dr Mirta Funez for evaluation.

## 2023-08-27 LAB
A VAGINAE DNA VAG QL NAA+PROBE: ABNORMAL SCORE
BACTERIA SPEC CULT: NORMAL
BACTERIA SPEC CULT: NORMAL
BVAB2 DNA VAG QL NAA+PROBE: ABNORMAL SCORE
C ALBICANS DNA VAG QL NAA+PROBE: NEGATIVE
C GLABRATA DNA VAG QL NAA+PROBE: NEGATIVE
C TRACH RRNA SPEC QL NAA+PROBE: NEGATIVE
CANDIDA KRUSEI: NEGATIVE
CANDIDA LUSITANIAE, NAA, 180015: NEGATIVE
CANDIDA PARAPSILOSIS/TROPICALIS: NEGATIVE
MEGA1 DNA VAG QL NAA+PROBE: ABNORMAL SCORE
N GONORRHOEA RRNA SPEC QL NAA+PROBE: NEGATIVE
SERVICE CMNT-IMP: NORMAL
T VAGINALIS RRNA SPEC QL NAA+PROBE: NEGATIVE

## 2023-08-29 DIAGNOSIS — Z13.6 SCREENING, ISCHEMIC HEART DISEASE: ICD-10-CM

## 2023-08-29 DIAGNOSIS — E78.00 PURE HYPERCHOLESTEROLEMIA: ICD-10-CM

## 2023-08-29 DIAGNOSIS — Z13.1 SCREENING FOR DIABETES MELLITUS: ICD-10-CM

## 2023-08-29 DIAGNOSIS — Z13.29 SCREENING FOR THYROID DISORDER: ICD-10-CM

## 2023-08-29 LAB
ALBUMIN SERPL-MCNC: 4 G/DL (ref 3.5–5)
ALBUMIN/GLOB SERPL: 1.1 (ref 0.4–1.6)
ALP SERPL-CCNC: 50 U/L (ref 50–136)
ALT SERPL-CCNC: 15 U/L (ref 12–65)
ANION GAP SERPL CALC-SCNC: 6 MMOL/L (ref 2–11)
APPEARANCE UR: ABNORMAL
AST SERPL-CCNC: 12 U/L (ref 15–37)
BACTERIA URNS QL MICRO: ABNORMAL /HPF
BASOPHILS # BLD: 0 K/UL (ref 0–0.2)
BASOPHILS NFR BLD: 0 % (ref 0–2)
BILIRUB SERPL-MCNC: 0.6 MG/DL (ref 0.2–1.1)
BILIRUB UR QL: NEGATIVE
BUN SERPL-MCNC: 9 MG/DL (ref 6–23)
CALCIUM SERPL-MCNC: 9.2 MG/DL (ref 8.3–10.4)
CASTS URNS QL MICRO: ABNORMAL /LPF (ref 0–2)
CHLORIDE SERPL-SCNC: 108 MMOL/L (ref 101–110)
CHOLEST SERPL-MCNC: 187 MG/DL
CO2 SERPL-SCNC: 25 MMOL/L (ref 21–32)
COLOR UR: ABNORMAL
CREAT SERPL-MCNC: 0.8 MG/DL (ref 0.6–1)
DIFFERENTIAL METHOD BLD: NORMAL
EOSINOPHIL # BLD: 0.4 K/UL (ref 0–0.8)
EOSINOPHIL NFR BLD: 6 % (ref 0.5–7.8)
EPI CELLS #/AREA URNS HPF: ABNORMAL /HPF (ref 0–5)
ERYTHROCYTE [DISTWIDTH] IN BLOOD BY AUTOMATED COUNT: 13.1 % (ref 11.9–14.6)
GLOBULIN SER CALC-MCNC: 3.5 G/DL (ref 2.8–4.5)
GLUCOSE SERPL-MCNC: 87 MG/DL (ref 65–100)
GLUCOSE UR STRIP.AUTO-MCNC: NEGATIVE MG/DL
HCT VFR BLD AUTO: 41.5 % (ref 35.8–46.3)
HDLC SERPL-MCNC: 61 MG/DL (ref 40–60)
HDLC SERPL: 3.1
HGB BLD-MCNC: 13.2 G/DL (ref 11.7–15.4)
HGB UR QL STRIP: NEGATIVE
IMM GRANULOCYTES # BLD AUTO: 0 K/UL (ref 0–0.5)
IMM GRANULOCYTES NFR BLD AUTO: 0 % (ref 0–5)
KETONES UR QL STRIP.AUTO: NEGATIVE MG/DL
LDLC SERPL CALC-MCNC: 109.2 MG/DL
LEUKOCYTE ESTERASE UR QL STRIP.AUTO: ABNORMAL
LYMPHOCYTES # BLD: 2 K/UL (ref 0.5–4.6)
LYMPHOCYTES NFR BLD: 37 % (ref 13–44)
MCH RBC QN AUTO: 26.1 PG (ref 26.1–32.9)
MCHC RBC AUTO-ENTMCNC: 31.8 G/DL (ref 31.4–35)
MCV RBC AUTO: 82.2 FL (ref 82–102)
MONOCYTES # BLD: 0.5 K/UL (ref 0.1–1.3)
MONOCYTES NFR BLD: 9 % (ref 4–12)
MUCOUS THREADS URNS QL MICRO: 0 /LPF
NEUTS SEG # BLD: 2.6 K/UL (ref 1.7–8.2)
NEUTS SEG NFR BLD: 48 % (ref 43–78)
NITRITE UR QL STRIP.AUTO: NEGATIVE
NRBC # BLD: 0 K/UL (ref 0–0.2)
PH UR STRIP: 6.5 (ref 5–9)
PLATELET # BLD AUTO: 379 K/UL (ref 150–450)
PMV BLD AUTO: 9.5 FL (ref 9.4–12.3)
POTASSIUM SERPL-SCNC: 4.4 MMOL/L (ref 3.5–5.1)
PROT SERPL-MCNC: 7.5 G/DL (ref 6.3–8.2)
PROT UR STRIP-MCNC: NEGATIVE MG/DL
RBC # BLD AUTO: 5.05 M/UL (ref 4.05–5.2)
RBC #/AREA URNS HPF: ABNORMAL /HPF (ref 0–5)
SODIUM SERPL-SCNC: 139 MMOL/L (ref 133–143)
SP GR UR REFRACTOMETRY: 1.02 (ref 1–1.02)
TRIGL SERPL-MCNC: 84 MG/DL (ref 35–150)
TSH W FREE THYROID IF ABNORMAL: 1.2 UIU/ML (ref 0.36–3.74)
URINE CULTURE IF INDICATED: ABNORMAL
UROBILINOGEN UR QL STRIP.AUTO: 0.2 EU/DL (ref 0.2–1)
VLDLC SERPL CALC-MCNC: 16.8 MG/DL (ref 6–23)
WBC # BLD AUTO: 5.5 K/UL (ref 4.3–11.1)
WBC URNS QL MICRO: ABNORMAL /HPF (ref 0–4)

## 2023-08-29 NOTE — PROGRESS NOTES
SUBJECTIVE:   29 y.o. female for annual routine Pap and checkup. 30-year-old  female comes in for cpe  1. Some dysuria last week better now. Would like repeat urinalysis. 2. Last pap smear  normal.  3.  Hyperlipidemia. Ldl down to 109 hdl 61.  4.  Complains of bumps over her tattoo on right arm. Probable keratosis pilaris. Advised hydrocortisone as needed. 5.  Lab work done 2021 urinalysis normal TSH normal hemoglobin A1c normal cholesterol normal CMP normal CBC normal  Lab work done 2022 cholesterol 230 HDL 73  CMP within normal limits CBC within normal   Lab work done 2023 hemoglobin A1c 5.2 TSH normal cholesterol 187 HDL 61  GFR normal LFT normal CBC normal urinalysis indicative of not a clean-catch.     Orders Only on 2023   Component Date Value Ref Range Status    Hemoglobin A1C 2023 5.2  4.8 - 5.6 % Final    eAG 2023 103  mg/dL Final    Comment: Reference Range  Normal: 4.8-5.6  Diabetic >=6.5%  Normal       <5.7%      TSH w Free Thyroid if Abnormal 2023 1.20  0.358 - 3.740 UIU/ML Final    Cholesterol, Total 2023 187  <200 MG/DL Final    Comment: Borderline High: 200-239 mg/dL  High: Greater than or equal to 240 mg/dL      Triglycerides 2023 84  35 - 150 MG/DL Final    Comment: Borderline High: 150-199 mg/dL, High: 200-499 mg/dL  Very High: Greater than or equal to 500 mg/dL      HDL 2023 61 (H)  40 - 60 MG/DL Final    LDL Calculated 2023 109.2 (H)  <100 MG/DL Final    Comment: Near Optimal: 100-129 mg/dL  Borderline High: 130-159, High: 160-189 mg/dL  Very High: Greater than or equal to 190 mg/dL      VLDL Cholesterol Calculated 2023 16.8  6.0 - 23.0 MG/DL Final    Chol/HDL Ratio 2023 3.1    Final    Sodium 2023 139  133 - 143 mmol/L Final    Potassium 2023 4.4  3.5 - 5.1 mmol/L Final    Chloride 2023 108  101 - 110 mmol/L Final    CO2 2023 25  21 - 32 mmol/L Final    Anion Gap

## 2023-08-30 ENCOUNTER — PATIENT MESSAGE (OUTPATIENT)
Dept: OBGYN CLINIC | Age: 33
End: 2023-08-30

## 2023-08-30 ENCOUNTER — TELEPHONE (OUTPATIENT)
Dept: OBGYN CLINIC | Age: 33
End: 2023-08-30

## 2023-08-30 LAB
EST. AVERAGE GLUCOSE BLD GHB EST-MCNC: 103 MG/DL
HBA1C MFR BLD: 5.2 % (ref 4.8–5.6)

## 2023-08-30 NOTE — TELEPHONE ENCOUNTER
Dr. Mirta Funez patient, called in stating she still has not gotten any medication yet and is wondering if she needs to come back in or not. She is also complaining of still having pelvic pain.

## 2023-08-30 NOTE — TELEPHONE ENCOUNTER
Patient called requesting a call back from the nurses. Patient says she has some questions about their test results. She wants to know if she needs to come in again or if some medication will be sent in.

## 2023-09-01 LAB
BACTERIA SPEC CULT: NORMAL
BACTERIA SPEC CULT: NORMAL
SERVICE CMNT-IMP: NORMAL

## 2023-09-05 ASSESSMENT — ENCOUNTER SYMPTOMS
GASTROINTESTINAL NEGATIVE: 1
EYES NEGATIVE: 1
RESPIRATORY NEGATIVE: 1
ALLERGIC/IMMUNOLOGIC NEGATIVE: 1

## 2023-09-06 ENCOUNTER — OFFICE VISIT (OUTPATIENT)
Dept: INTERNAL MEDICINE CLINIC | Facility: CLINIC | Age: 33
End: 2023-09-06
Payer: MEDICAID

## 2023-09-06 VITALS
DIASTOLIC BLOOD PRESSURE: 69 MMHG | BODY MASS INDEX: 29.26 KG/M2 | HEART RATE: 70 BPM | SYSTOLIC BLOOD PRESSURE: 109 MMHG | HEIGHT: 62 IN | WEIGHT: 159 LBS | OXYGEN SATURATION: 98 %

## 2023-09-06 DIAGNOSIS — Z00.00 ANNUAL PHYSICAL EXAM: Primary | ICD-10-CM

## 2023-09-06 DIAGNOSIS — R30.0 DYSURIA: ICD-10-CM

## 2023-09-06 DIAGNOSIS — Z13.6 SCREENING, ISCHEMIC HEART DISEASE: ICD-10-CM

## 2023-09-06 DIAGNOSIS — E78.00 PURE HYPERCHOLESTEROLEMIA: ICD-10-CM

## 2023-09-06 DIAGNOSIS — L85.8 KERATOSIS PILARIS: ICD-10-CM

## 2023-09-06 PROCEDURE — 99395 PREV VISIT EST AGE 18-39: CPT | Performed by: FAMILY MEDICINE

## 2023-09-07 LAB
APPEARANCE UR: ABNORMAL
BACTERIA URNS QL MICRO: ABNORMAL /HPF
BILIRUB UR QL: NEGATIVE
CASTS URNS QL MICRO: 0 /LPF
COLOR UR: ABNORMAL
CRYSTALS URNS QL MICRO: 0 /LPF
EPI CELLS #/AREA URNS HPF: ABNORMAL /HPF
GLUCOSE UR STRIP.AUTO-MCNC: NEGATIVE MG/DL
HGB UR QL STRIP: NEGATIVE
KETONES UR QL STRIP.AUTO: NEGATIVE MG/DL
LEUKOCYTE ESTERASE UR QL STRIP.AUTO: ABNORMAL
MUCOUS THREADS URNS QL MICRO: 0 /LPF
NITRITE UR QL STRIP.AUTO: NEGATIVE
OTHER OBSERVATIONS: ABNORMAL
PH UR STRIP: 7 (ref 5–9)
PROT UR STRIP-MCNC: NEGATIVE MG/DL
RBC #/AREA URNS HPF: 0 /HPF
SP GR UR REFRACTOMETRY: 1.02 (ref 1–1.02)
URINE CULTURE IF INDICATED: ABNORMAL
UROBILINOGEN UR QL STRIP.AUTO: 0.2 EU/DL (ref 0.2–1)
WBC URNS QL MICRO: ABNORMAL /HPF

## 2023-09-07 RX ORDER — NITROFURANTOIN 25; 75 MG/1; MG/1
100 CAPSULE ORAL 2 TIMES DAILY
Qty: 14 CAPSULE | Refills: 0 | Status: SHIPPED | OUTPATIENT
Start: 2023-09-07 | End: 2023-09-14

## 2023-09-07 NOTE — PROGRESS NOTES
Inform patient urinalysis improved. Less wbc. Will send abx to pharmacy to take only if dysuria gets worse or not improve.

## 2023-10-30 ENCOUNTER — OFFICE VISIT (OUTPATIENT)
Dept: OBGYN CLINIC | Age: 33
End: 2023-10-30
Payer: MEDICAID

## 2023-10-30 VITALS
DIASTOLIC BLOOD PRESSURE: 64 MMHG | WEIGHT: 162 LBS | OXYGEN SATURATION: 99 % | RESPIRATION RATE: 18 BRPM | BODY MASS INDEX: 29.81 KG/M2 | HEIGHT: 62 IN | SYSTOLIC BLOOD PRESSURE: 122 MMHG

## 2023-10-30 DIAGNOSIS — R23.2 HOT FLASHES: ICD-10-CM

## 2023-10-30 DIAGNOSIS — Z11.3 SCREEN FOR STD (SEXUALLY TRANSMITTED DISEASE): Primary | ICD-10-CM

## 2023-10-30 LAB
HBV SURFACE AG SER QL: NONREACTIVE
HCV AB SER QL: NONREACTIVE
HIV 1+2 AB+HIV1 P24 AG SERPL QL IA: NONREACTIVE
HIV 1/2 RESULT COMMENT: NORMAL

## 2023-10-30 PROCEDURE — 99214 OFFICE O/P EST MOD 30 MIN: CPT | Performed by: NURSE PRACTITIONER

## 2023-10-30 NOTE — PROGRESS NOTES
CC:   Chief Complaint   Patient presents with    Other     Cramping and spotting after takin plan B on 10/23/23  Screening for STD's       HPI:    Christina Briggs  is a 29 y.o. , [id number], Patient's last menstrual period was 10/08/2023.,  who is seen today with c/o cramping and bleeding after taking plan B. The patient informs me that she had unprotected intercourse on Sunday and did take Plan B Monday (7 days ago). States started experiencing mild cramping and spotting which started yesterday and denies having to take OTC pain medications. States she has been wearing a pad and states \"this is the first one I have put on today and when I took it off it was not soaked. \" Denies fevers, chills, abnormal vaginal discharge, itching, odor, irritation, urinary frequency, urgency or dysuria today. Patient also informs me that for the past few weeks, she has been experiencing \"more sweating at night. I have noticed it maybe around the time of my period and thought maybe it was hormonal but have also noticed it over the past few weeks. \" States \"in the middle of the night my shirt is wet. \"       Contraception: None    Menses:  Q  28-30 Days x 7 days, Moderate  Flow, No intermenstrual VB/spotting, No dysmenorrhea    Sexually active-male partner. No changes in partners however, would like full STD testing today on Nuswab and serology panel. Denies post coital VB or dyspareunia. GYN HISTORY:  As per HPI     Hx STDs: denies     Last Pap: 2021-Neg  Hx abnormal paps: Denies     PCP: Dr. Karolina Tracy with [facility]. Sees regularly. Last visit on 9/6/23. Current Outpatient Medications on File Prior to Visit   Medication Sig Dispense Refill    [DISCONTINUED] blood glucose monitor strips Test 4 times a day & as needed for symptoms of irregular blood glucose. Dispense sufficient amount for indicated testing frequency plus additional to accommodate PRN testing needs.  120 strip 1     No current facility-administered

## 2023-10-31 LAB
RPR SER QL: NONREACTIVE
TSH W FREE THYROID IF ABNORMAL: 0.9 UIU/ML (ref 0.36–3.74)

## 2023-11-01 LAB
C TRACH RRNA SPEC QL NAA+PROBE: NEGATIVE
N GONORRHOEA RRNA SPEC QL NAA+PROBE: NEGATIVE
SPECIMEN SOURCE: NORMAL
T VAGINALIS RRNA SPEC QL NAA+PROBE: NEGATIVE

## 2023-12-22 DIAGNOSIS — Z11.1 VISIT FOR TB SKIN TEST: Primary | ICD-10-CM

## 2023-12-22 LAB
MM INDURATION, POC: 0 MM (ref 0–5)
PPD, POC: NEGATIVE

## 2024-03-12 ENCOUNTER — PATIENT MESSAGE (OUTPATIENT)
Dept: OBGYN CLINIC | Age: 34
End: 2024-03-12

## 2024-03-12 DIAGNOSIS — B00.9 HERPES SIMPLEX VIRUS TYPE 2 (HSV-2) INFECTION AFFECTING PREGNANCY IN THIRD TRIMESTER, ANTEPARTUM: Primary | ICD-10-CM

## 2024-03-12 DIAGNOSIS — O98.513 HERPES SIMPLEX VIRUS TYPE 2 (HSV-2) INFECTION AFFECTING PREGNANCY IN THIRD TRIMESTER, ANTEPARTUM: Primary | ICD-10-CM

## 2024-03-12 RX ORDER — VALACYCLOVIR HYDROCHLORIDE 500 MG/1
500 TABLET, FILM COATED ORAL 2 TIMES DAILY
Qty: 60 TABLET | Refills: 0 | Status: SHIPPED | OUTPATIENT
Start: 2024-03-12

## 2024-04-08 DIAGNOSIS — O98.513 HERPES SIMPLEX VIRUS TYPE 2 (HSV-2) INFECTION AFFECTING PREGNANCY IN THIRD TRIMESTER, ANTEPARTUM: ICD-10-CM

## 2024-04-08 DIAGNOSIS — B00.9 HERPES SIMPLEX VIRUS TYPE 2 (HSV-2) INFECTION AFFECTING PREGNANCY IN THIRD TRIMESTER, ANTEPARTUM: ICD-10-CM

## 2024-04-08 RX ORDER — VALACYCLOVIR HYDROCHLORIDE 500 MG/1
500 TABLET, FILM COATED ORAL 2 TIMES DAILY
Qty: 60 TABLET | Refills: 0 | OUTPATIENT
Start: 2024-04-08

## 2024-04-11 ENCOUNTER — OFFICE VISIT (OUTPATIENT)
Dept: OBGYN CLINIC | Age: 34
End: 2024-04-11
Payer: MEDICAID

## 2024-04-11 VITALS
DIASTOLIC BLOOD PRESSURE: 68 MMHG | HEIGHT: 62 IN | WEIGHT: 163 LBS | SYSTOLIC BLOOD PRESSURE: 120 MMHG | BODY MASS INDEX: 30 KG/M2

## 2024-04-11 DIAGNOSIS — Z12.4 SCREENING FOR CERVICAL CANCER: ICD-10-CM

## 2024-04-11 DIAGNOSIS — N92.6 IRREGULAR PERIODS: ICD-10-CM

## 2024-04-11 DIAGNOSIS — Z11.51 SCREENING FOR HUMAN PAPILLOMAVIRUS (HPV): ICD-10-CM

## 2024-04-11 DIAGNOSIS — Z01.419 WELL WOMAN EXAM WITH ROUTINE GYNECOLOGICAL EXAM: Primary | ICD-10-CM

## 2024-04-11 LAB
HCG, PREGNANCY, URINE, POC: NEGATIVE
VALID INTERNAL CONTROL, POC: YES

## 2024-04-11 PROCEDURE — 99395 PREV VISIT EST AGE 18-39: CPT | Performed by: OBSTETRICS & GYNECOLOGY

## 2024-04-11 PROCEDURE — 81025 URINE PREGNANCY TEST: CPT | Performed by: OBSTETRICS & GYNECOLOGY

## 2024-04-11 PROCEDURE — 99459 PELVIC EXAMINATION: CPT | Performed by: OBSTETRICS & GYNECOLOGY

## 2024-04-11 SDOH — ECONOMIC STABILITY: INCOME INSECURITY: HOW HARD IS IT FOR YOU TO PAY FOR THE VERY BASICS LIKE FOOD, HOUSING, MEDICAL CARE, AND HEATING?: NOT VERY HARD

## 2024-04-11 SDOH — ECONOMIC STABILITY: FOOD INSECURITY: WITHIN THE PAST 12 MONTHS, YOU WORRIED THAT YOUR FOOD WOULD RUN OUT BEFORE YOU GOT MONEY TO BUY MORE.: NEVER TRUE

## 2024-04-11 SDOH — ECONOMIC STABILITY: FOOD INSECURITY: WITHIN THE PAST 12 MONTHS, THE FOOD YOU BOUGHT JUST DIDN'T LAST AND YOU DIDN'T HAVE MONEY TO GET MORE.: NEVER TRUE

## 2024-04-11 NOTE — PROGRESS NOTES
Cranial Nerves: No cranial nerve deficit.      Motor: No abnormal muscle tone.      Coordination: Coordination normal.   Psychiatric:         Behavior: Behavior normal.         Thought Content: Thought content normal.         Judgment: Judgment normal.         1. Well woman exam with routine gynecological exam    2. Screening for cervical cancer    3. Screening for human papillomavirus (HPV)    4. Irregular periods      Orders Placed This Encounter   Procedures    PAP IG, Aptima HPV and rfx 16/18,45 (200550)    AMB POC URINE PREGNANCY TEST, VISUAL COLOR COMPARISON     No orders of the defined types were placed in this encounter.      Routine age-appropriate well woman counseling was performed.  Pap guidelines reviewed, individual risk factors and individual preferences. Pap collected today.    Return in about 1 year (around 4/11/2025), or if symptoms worsen or fail to improve, for Annual exam.

## 2024-04-17 LAB
COLLECTION METHOD: NORMAL
CYTOLOGIST CVX/VAG CYTO: NORMAL
CYTOLOGY CVX/VAG DOC THIN PREP: NORMAL
HPV APTIMA: NEGATIVE
HPV GENOTYPE REFLEX: NORMAL
Lab: NORMAL
PAP SOURCE: NORMAL
PATH REPORT.FINAL DX SPEC: NORMAL
STAT OF ADQ CVX/VAG CYTO-IMP: NORMAL

## 2024-06-26 ENCOUNTER — OFFICE VISIT (OUTPATIENT)
Dept: OBGYN CLINIC | Age: 34
End: 2024-06-26
Payer: MEDICAID

## 2024-06-26 VITALS
DIASTOLIC BLOOD PRESSURE: 74 MMHG | HEIGHT: 62 IN | WEIGHT: 164 LBS | SYSTOLIC BLOOD PRESSURE: 116 MMHG | BODY MASS INDEX: 30.18 KG/M2

## 2024-06-26 DIAGNOSIS — B96.89 BACTERIAL VAGINOSIS: ICD-10-CM

## 2024-06-26 DIAGNOSIS — R10.2 PELVIC PAIN: Primary | ICD-10-CM

## 2024-06-26 DIAGNOSIS — Z11.3 SCREENING FOR STDS (SEXUALLY TRANSMITTED DISEASES): ICD-10-CM

## 2024-06-26 DIAGNOSIS — Z11.8 SCREENING EXAMINATION FOR PARASITIC INFECTION: ICD-10-CM

## 2024-06-26 DIAGNOSIS — N89.8 VAGINAL ODOR: ICD-10-CM

## 2024-06-26 DIAGNOSIS — N76.0 BACTERIAL VAGINOSIS: ICD-10-CM

## 2024-06-26 DIAGNOSIS — N92.6 MISSED MENSES: ICD-10-CM

## 2024-06-26 LAB
HCG, PREGNANCY, URINE, POC: NEGATIVE
VALID INTERNAL CONTROL, POC: YES

## 2024-06-26 PROCEDURE — 99213 OFFICE O/P EST LOW 20 MIN: CPT | Performed by: NURSE PRACTITIONER

## 2024-06-26 PROCEDURE — 81025 URINE PREGNANCY TEST: CPT | Performed by: NURSE PRACTITIONER

## 2024-06-26 PROCEDURE — 99459 PELVIC EXAMINATION: CPT | Performed by: NURSE PRACTITIONER

## 2024-06-26 RX ORDER — METRONIDAZOLE 7.5 MG/G
GEL VAGINAL
Qty: 70 G | Refills: 0 | Status: SHIPPED | OUTPATIENT
Start: 2024-06-26 | End: 2024-07-03

## 2024-06-26 NOTE — PROGRESS NOTES
CC:   Chief Complaint   Patient presents with    Pelvic Pain     Pt states she has cramping and swelling in pelvic area      Vaginal Odor       HPI:    Jesusita  is a 34 y.o. , , Patient's last menstrual period was 2024 (exact date)., who is seen today for vaginal odor and pelvic pain. The patient states she started experiencing cramping pains at the beginning of this month (3 weeks ago) to bilateral sides of abdomen and reports \"it was pretty severe pain where I had to lay down and I felt like my sides were swollen and bloated-like\" and reports symptoms lasting a few days and then resolving on their own. She also reports experiencing vaginal odor at beginning of month as well (3 weeks ago). She describes vaginal odor as \"maybe a chemical, fishy, like old period\" type odor. She denies fevers, chills, vaginal itching, urinary frequency, urgency or dysuria today.     Denies recent changes to soaps or laundry detergents.     Contraception: None     Menses:  Q 30 Days x 7 days, Moderate Flow, No intermenstrual VB/spotting, Mild dysmenorrhea (does not take any OTC medications)    Sexually active-male partner. Would like STD testing on Nuswab today.   Denies post coital VB or dyspareunia.      GYN HISTORY:  As per HPI     Hx STDs: denies     Last Pap: 2024-Neg cotesting  Hx abnormal paps: Denies       Current Outpatient Medications on File Prior to Visit   Medication Sig Dispense Refill    [DISCONTINUED] blood glucose monitor strips Test 4 times a day & as needed for symptoms of irregular blood glucose. Dispense sufficient amount for indicated testing frequency plus additional to accommodate PRN testing needs. 120 strip 1     No current facility-administered medications on file prior to visit.         Past Medical History:   Diagnosis Date    Ectopic pregnancy          Past Surgical History:   Procedure Laterality Date    GALLBLADDER SURGERY      VAGINAL DELIVERY  2022         Outpatient Encounter

## 2024-06-29 LAB
A VAGINAE DNA VAG QL NAA+PROBE: ABNORMAL SCORE
BVAB2 DNA VAG QL NAA+PROBE: ABNORMAL SCORE
C ALBICANS DNA VAG QL NAA+PROBE: NEGATIVE
C GLABRATA DNA VAG QL NAA+PROBE: NEGATIVE
C TRACH RRNA SPEC QL NAA+PROBE: NEGATIVE
MEGA1 DNA VAG QL NAA+PROBE: ABNORMAL SCORE
N GONORRHOEA RRNA SPEC QL NAA+PROBE: NEGATIVE
SPECIMEN SOURCE: ABNORMAL
T VAGINALIS RRNA SPEC QL NAA+PROBE: NEGATIVE

## 2024-08-07 ENCOUNTER — OFFICE VISIT (OUTPATIENT)
Dept: OBGYN CLINIC | Age: 34
End: 2024-08-07
Payer: COMMERCIAL

## 2024-08-07 ENCOUNTER — PROCEDURE VISIT (OUTPATIENT)
Dept: OBGYN CLINIC | Age: 34
End: 2024-08-07
Payer: COMMERCIAL

## 2024-08-07 VITALS
DIASTOLIC BLOOD PRESSURE: 74 MMHG | SYSTOLIC BLOOD PRESSURE: 120 MMHG | WEIGHT: 163 LBS | BODY MASS INDEX: 30 KG/M2 | HEIGHT: 62 IN

## 2024-08-07 DIAGNOSIS — R10.2 PELVIC PAIN: ICD-10-CM

## 2024-08-07 DIAGNOSIS — R10.9 ABDOMINAL CRAMPING: ICD-10-CM

## 2024-08-07 DIAGNOSIS — R14.0 BLOATING: ICD-10-CM

## 2024-08-07 DIAGNOSIS — Z30.09 GENERAL COUNSELLING AND ADVICE ON CONTRACEPTION: ICD-10-CM

## 2024-08-07 DIAGNOSIS — R10.2 PELVIC PRESSURE IN FEMALE: Primary | ICD-10-CM

## 2024-08-07 DIAGNOSIS — N89.8 VAGINAL ODOR: ICD-10-CM

## 2024-08-07 DIAGNOSIS — N89.8 VAGINAL DISCHARGE: Primary | ICD-10-CM

## 2024-08-07 PROCEDURE — 99459 PELVIC EXAMINATION: CPT | Performed by: OBSTETRICS & GYNECOLOGY

## 2024-08-07 PROCEDURE — 99214 OFFICE O/P EST MOD 30 MIN: CPT | Performed by: OBSTETRICS & GYNECOLOGY

## 2024-08-07 PROCEDURE — 76830 TRANSVAGINAL US NON-OB: CPT | Performed by: OBSTETRICS & GYNECOLOGY

## 2024-08-07 RX ORDER — CLINDAMYCIN PHOSPHATE 20 MG/G
CREAM VAGINAL
Qty: 1 EACH | Refills: 2 | Status: SHIPPED | OUTPATIENT
Start: 2024-08-07 | End: 2024-08-14

## 2024-08-07 NOTE — PROGRESS NOTES
Jesusita Mascorro  34 y.o.  1990  [id number]    Today:  24        Chief Complaint   Patient presents with    Ultrasound    Pelvic Pain       Subjective:  34 y.o. , (ectopic & SAB).  New pt to me, previously saw Dr. Irwin and Felicitas Ayala NP.     Presents today with concerns regarding:   Bloating, pelvic pain, cramping, increased pressure.   July -->amenorrhea.    2024 nl period  2024 amenorrhea  Period started today.  Reports amenorrhea ~ 2-3x over the past 6 m (since ).  Periods  last ~ 7 d, CDs # 2-3 are the heaviest, never requires double products.     RLQ > LLQ  Sx started in  and are \"kind of \" improving    BMs ~ q am, normal.  No blood or mucus seen in her stool    BC:  none    2024 patient requested US today:   Anteverted uterus, 7.4/5.6/4, vol 84 cm³  Endometrial = 3.14 mm  Bilateral ovaries appear normal  No free fluid  Right adnexa appears normal  Left adnexa-->mildly prominent vasculature visualized  Per review of images, bilateral ovaries appear ~PCOS  C. Roya, EDDIE    Patient's last menstrual period was 2024.    2024, Felicitas's note:  Cc:   Pelvic pain-  cramping and swelling in pelvic area  Vaginal odor    34 y.o. , , LMP 2024 (exact date)--> c/o  vaginal odor and pelvic pain.   Cramping pains started ~3 weeks ago, localized to bilateral sides of abdomen, \"pretty severe pain where I had to lay down and I felt like my sides were swollen and bloated-like\" Symptoms lasted a few days then spontaneously resolved.She Also reports: vaginal odor started ~3 weeks ago.  Vaginal odor:  \"maybe a chemical, fishy, like old period\" type odor.   Denies fevers, chills, vaginal itching, urinary frequency, urgency or dysuria today  Nuswab + BV, neg:  yeast, chlamydia, gonorrhea and trichomonas       OB History    Para Term  AB Living   4 2 2 0 2 2   SAB IAB Ectopic Molar Multiple Live Births   1 0 1 0 0 2       Allergies   Allergen

## 2024-08-11 LAB
A VAGINAE DNA VAG QL NAA+PROBE: ABNORMAL SCORE
BVAB2 DNA VAG QL NAA+PROBE: ABNORMAL SCORE
C ALBICANS DNA VAG QL NAA+PROBE: NEGATIVE
C GLABRATA DNA VAG QL NAA+PROBE: NEGATIVE
C TRACH RRNA SPEC QL NAA+PROBE: NEGATIVE
CANDIDA KRUSEI: NEGATIVE
CANDIDA LUSITANIAE, NAA: NEGATIVE
CANDIDA PARAPSILOSIS/TROPICALIS: NEGATIVE
MEGA1 DNA VAG QL NAA+PROBE: ABNORMAL SCORE
N GONORRHOEA RRNA SPEC QL NAA+PROBE: NEGATIVE
T VAGINALIS RRNA SPEC QL NAA+PROBE: NEGATIVE

## 2024-10-27 SDOH — HEALTH STABILITY: PHYSICAL HEALTH: ON AVERAGE, HOW MANY DAYS PER WEEK DO YOU ENGAGE IN MODERATE TO STRENUOUS EXERCISE (LIKE A BRISK WALK)?: 1 DAY

## 2024-10-27 SDOH — HEALTH STABILITY: PHYSICAL HEALTH: ON AVERAGE, HOW MANY MINUTES DO YOU ENGAGE IN EXERCISE AT THIS LEVEL?: 20 MIN

## 2024-10-28 ENCOUNTER — OFFICE VISIT (OUTPATIENT)
Dept: INTERNAL MEDICINE CLINIC | Facility: CLINIC | Age: 34
End: 2024-10-28
Payer: COMMERCIAL

## 2024-10-28 ENCOUNTER — PATIENT MESSAGE (OUTPATIENT)
Dept: INTERNAL MEDICINE CLINIC | Facility: CLINIC | Age: 34
End: 2024-10-28

## 2024-10-28 VITALS
BODY MASS INDEX: 29.92 KG/M2 | SYSTOLIC BLOOD PRESSURE: 128 MMHG | HEART RATE: 71 BPM | OXYGEN SATURATION: 100 % | HEIGHT: 62 IN | RESPIRATION RATE: 16 BRPM | WEIGHT: 162.6 LBS | DIASTOLIC BLOOD PRESSURE: 76 MMHG

## 2024-10-28 DIAGNOSIS — R42 DIZZINESS: Primary | ICD-10-CM

## 2024-10-28 DIAGNOSIS — Z00.00 ROUTINE MEDICAL EXAM: ICD-10-CM

## 2024-10-28 DIAGNOSIS — J30.2 SEASONAL ALLERGIES: ICD-10-CM

## 2024-10-28 DIAGNOSIS — M54.2 NECK PAIN: ICD-10-CM

## 2024-10-28 DIAGNOSIS — H93.11 TINNITUS OF RIGHT EAR: ICD-10-CM

## 2024-10-28 LAB
ALBUMIN SERPL-MCNC: 4.1 G/DL (ref 3.5–5)
ALBUMIN/GLOB SERPL: 1.1 (ref 1–1.9)
ALP SERPL-CCNC: 57 U/L (ref 35–104)
ALT SERPL-CCNC: 7 U/L (ref 8–45)
ANION GAP SERPL CALC-SCNC: 11 MMOL/L (ref 9–18)
APPEARANCE UR: ABNORMAL
AST SERPL-CCNC: 20 U/L (ref 15–37)
BACTERIA URNS QL MICRO: ABNORMAL /HPF
BASOPHILS # BLD: 0 K/UL (ref 0–0.2)
BASOPHILS NFR BLD: 1 % (ref 0–2)
BILIRUB SERPL-MCNC: 0.7 MG/DL (ref 0–1.2)
BILIRUB UR QL: NEGATIVE
BUN SERPL-MCNC: 7 MG/DL (ref 6–23)
CALCIUM SERPL-MCNC: 9.6 MG/DL (ref 8.8–10.2)
CASTS URNS QL MICRO: ABNORMAL /LPF
CHLORIDE SERPL-SCNC: 104 MMOL/L (ref 98–107)
CHOLEST SERPL-MCNC: 159 MG/DL (ref 0–200)
CO2 SERPL-SCNC: 25 MMOL/L (ref 20–28)
COLOR UR: ABNORMAL
CREAT SERPL-MCNC: 0.75 MG/DL (ref 0.6–1.1)
CRYSTALS URNS QL MICRO: 0 /LPF
DIFFERENTIAL METHOD BLD: ABNORMAL
EOSINOPHIL # BLD: 0.4 K/UL (ref 0–0.8)
EOSINOPHIL NFR BLD: 7 % (ref 0.5–7.8)
EPI CELLS #/AREA URNS HPF: ABNORMAL /HPF
ERYTHROCYTE [DISTWIDTH] IN BLOOD BY AUTOMATED COUNT: 13.6 % (ref 11.9–14.6)
GLOBULIN SER CALC-MCNC: 3.6 G/DL (ref 2.3–3.5)
GLUCOSE SERPL-MCNC: 91 MG/DL (ref 70–99)
GLUCOSE UR STRIP.AUTO-MCNC: NEGATIVE MG/DL
HCT VFR BLD AUTO: 41.4 % (ref 35.8–46.3)
HDLC SERPL-MCNC: 56 MG/DL (ref 40–60)
HDLC SERPL: 2.8 (ref 0–5)
HGB BLD-MCNC: 13 G/DL (ref 11.7–15.4)
HGB UR QL STRIP: NEGATIVE
IMM GRANULOCYTES # BLD AUTO: 0 K/UL (ref 0–0.5)
IMM GRANULOCYTES NFR BLD AUTO: 0 % (ref 0–5)
KETONES UR QL STRIP.AUTO: NEGATIVE MG/DL
LDLC SERPL CALC-MCNC: 88 MG/DL (ref 0–100)
LEUKOCYTE ESTERASE UR QL STRIP.AUTO: ABNORMAL
LYMPHOCYTES # BLD: 2 K/UL (ref 0.5–4.6)
LYMPHOCYTES NFR BLD: 36 % (ref 13–44)
MCH RBC QN AUTO: 25.6 PG (ref 26.1–32.9)
MCHC RBC AUTO-ENTMCNC: 31.4 G/DL (ref 31.4–35)
MCV RBC AUTO: 81.7 FL (ref 82–102)
MONOCYTES # BLD: 0.5 K/UL (ref 0.1–1.3)
MONOCYTES NFR BLD: 8 % (ref 4–12)
MUCOUS THREADS URNS QL MICRO: 0 /LPF
NEUTS SEG # BLD: 2.8 K/UL (ref 1.7–8.2)
NEUTS SEG NFR BLD: 48 % (ref 43–78)
NITRITE UR QL STRIP.AUTO: NEGATIVE
NRBC # BLD: 0 K/UL (ref 0–0.2)
OTHER OBSERVATIONS: ABNORMAL
PH UR STRIP: 8 (ref 5–9)
PLATELET # BLD AUTO: 345 K/UL (ref 150–450)
PMV BLD AUTO: 9.5 FL (ref 9.4–12.3)
POTASSIUM SERPL-SCNC: 4.4 MMOL/L (ref 3.5–5.1)
PROT SERPL-MCNC: 7.8 G/DL (ref 6.3–8.2)
PROT UR STRIP-MCNC: ABNORMAL MG/DL
RBC # BLD AUTO: 5.07 M/UL (ref 4.05–5.2)
RBC #/AREA URNS HPF: ABNORMAL /HPF
SODIUM SERPL-SCNC: 140 MMOL/L (ref 136–145)
SP GR UR REFRACTOMETRY: 1.02 (ref 1–1.02)
TRIGL SERPL-MCNC: 78 MG/DL (ref 0–150)
TSH, 3RD GENERATION: 1.74 UIU/ML (ref 0.27–4.2)
URINE CULTURE IF INDICATED: ABNORMAL
UROBILINOGEN UR QL STRIP.AUTO: 1 EU/DL (ref 0.2–1)
VLDLC SERPL CALC-MCNC: 16 MG/DL (ref 6–23)
WBC # BLD AUTO: 5.7 K/UL (ref 4.3–11.1)
WBC URNS QL MICRO: ABNORMAL /HPF

## 2024-10-28 PROCEDURE — 99214 OFFICE O/P EST MOD 30 MIN: CPT | Performed by: INTERNAL MEDICINE

## 2024-10-28 RX ORDER — CETIRIZINE HYDROCHLORIDE 10 MG/1
10 TABLET ORAL DAILY
Qty: 30 TABLET | Refills: 0 | Status: SHIPPED | OUTPATIENT
Start: 2024-10-28

## 2024-10-28 RX ORDER — MECLIZINE HYDROCHLORIDE 25 MG/1
25 TABLET ORAL 3 TIMES DAILY PRN
Qty: 15 TABLET | Refills: 0 | Status: SHIPPED | OUTPATIENT
Start: 2024-10-28 | End: 2024-11-07

## 2024-10-28 ASSESSMENT — PATIENT HEALTH QUESTIONNAIRE - PHQ9
2. FEELING DOWN, DEPRESSED OR HOPELESS: NOT AT ALL
1. LITTLE INTEREST OR PLEASURE IN DOING THINGS: NOT AT ALL
SUM OF ALL RESPONSES TO PHQ QUESTIONS 1-9: 0
SUM OF ALL RESPONSES TO PHQ QUESTIONS 1-9: 0
SUM OF ALL RESPONSES TO PHQ9 QUESTIONS 1 & 2: 0
SUM OF ALL RESPONSES TO PHQ QUESTIONS 1-9: 0
SUM OF ALL RESPONSES TO PHQ QUESTIONS 1-9: 0

## 2024-10-28 NOTE — ASSESSMENT & PLAN NOTE
Could be fluid related/sinus congestion/seasonal allergies. If no improvement with antihistamine, will warrant further eval by specialist.

## 2024-10-28 NOTE — ASSESSMENT & PLAN NOTE
Chronic, not at goal (unstable), continue current treatment plan   - We discussed lifestyle mod with exercise and diet. I recommended the Dr. Love book(How Not To Diet).

## 2024-10-28 NOTE — PROGRESS NOTES
meclizine (ANTIVERT) 25 mg, Oral, 3 TIMES DAILY PRN    Probiotic Product (PROBIOTIC DAILY PO) 1 capsule, Oral, DAILY     Vitals:    10/28/24 0922   BP: 128/76   Site: Right Upper Arm   Position: Sitting   Pulse: 71   Resp: 16   SpO2: 100%   Weight: 73.8 kg (162 lb 9.6 oz)   Height: 1.575 m (5' 2.01\")     BP Readings from Last 3 Encounters:   10/28/24 128/76   08/07/24 120/74   06/26/24 116/74     Body mass index is 29.73 kg/m².  Wt Readings from Last 3 Encounters:   10/28/24 73.8 kg (162 lb 9.6 oz)   08/07/24 73.9 kg (163 lb)   06/26/24 74.4 kg (164 lb)         10/28/2024     9:24 AM   PHQ-9    Little interest or pleasure in doing things 0   Feeling down, depressed, or hopeless 0   PHQ-2 Score 0   PHQ-9 Total Score 0      Physical Exam  Constitutional:       Appearance: Normal appearance.   HENT:      Head: Normocephalic and atraumatic.   Cardiovascular:      Rate and Rhythm: Normal rate and regular rhythm.   Pulmonary:      Effort: Pulmonary effort is normal. No respiratory distress.   Neurological:      General: No focal deficit present.      Mental Status: She is alert. Mental status is at baseline.           Lab Results   Component Value Date     08/29/2023    K 4.4 08/29/2023     08/29/2023    CO2 25 08/29/2023    BUN 9 08/29/2023    CREATININE 0.80 08/29/2023    GLUCOSE 87 08/29/2023    CALCIUM 9.2 08/29/2023    BILITOT 0.6 08/29/2023    ALKPHOS 50 08/29/2023    AST 12 (L) 08/29/2023    ALT 15 08/29/2023    LABGLOM >60 08/29/2023    GFRAA >60 09/08/2022    AGRATIO 1.6 09/07/2021    GLOB 3.5 08/29/2023     Lab Results   Component Value Date    WBC 5.5 08/29/2023    HGB 13.2 08/29/2023    HCT 41.5 08/29/2023    MCV 82.2 08/29/2023     08/29/2023    LYMPHOPCT 37 08/29/2023    RBC 5.05 08/29/2023    MCH 26.1 08/29/2023    MCHC 31.8 08/29/2023    RDW 13.1 08/29/2023     Lab Results   Component Value Date    CHOL 187 08/29/2023    TRIG 84 08/29/2023    HDL 61 (H) 08/29/2023    .2 (H)

## 2024-10-30 LAB
BACTERIA SPEC CULT: ABNORMAL
BACTERIA SPEC CULT: ABNORMAL
SERVICE CMNT-IMP: ABNORMAL

## 2024-11-01 NOTE — TELEPHONE ENCOUNTER
Message from patient: To clarify, my urine labs are normal but shows my protein is abnormal, bacteria was 4+, and leukocytes and that’s not a concern?     Lab results given yesterday afternoon.

## 2024-12-11 ENCOUNTER — OFFICE VISIT (OUTPATIENT)
Dept: OBGYN CLINIC | Age: 34
End: 2024-12-11
Payer: COMMERCIAL

## 2024-12-11 VITALS
BODY MASS INDEX: 29.63 KG/M2 | DIASTOLIC BLOOD PRESSURE: 78 MMHG | SYSTOLIC BLOOD PRESSURE: 110 MMHG | WEIGHT: 161 LBS | HEIGHT: 62 IN

## 2024-12-11 DIAGNOSIS — N89.8 VAGINAL ITCHING: ICD-10-CM

## 2024-12-11 DIAGNOSIS — Z11.8 SCREENING EXAMINATION FOR PARASITIC INFECTION: ICD-10-CM

## 2024-12-11 DIAGNOSIS — Z11.3 SCREENING FOR STDS (SEXUALLY TRANSMITTED DISEASES): Primary | ICD-10-CM

## 2024-12-11 DIAGNOSIS — B96.89 BV (BACTERIAL VAGINOSIS): ICD-10-CM

## 2024-12-11 DIAGNOSIS — N76.0 BV (BACTERIAL VAGINOSIS): ICD-10-CM

## 2024-12-11 DIAGNOSIS — N89.8 VAGINAL ODOR: ICD-10-CM

## 2024-12-11 PROCEDURE — 99459 PELVIC EXAMINATION: CPT | Performed by: OBSTETRICS & GYNECOLOGY

## 2024-12-11 PROCEDURE — 99213 OFFICE O/P EST LOW 20 MIN: CPT | Performed by: OBSTETRICS & GYNECOLOGY

## 2024-12-11 NOTE — PROGRESS NOTES
N76.0 clindamycin (CLEOCIN) 300 MG capsule    B96.89            Orders Placed This Encounter   Procedures    [facility] Vaginitis Plus (VG+) with Canddia (Six Species)    Culture, Urine     Lisa Woo MD, FACOG

## 2024-12-13 LAB
BACTERIA SPEC CULT: NORMAL
SERVICE CMNT-IMP: NORMAL

## 2024-12-23 RX ORDER — CLINDAMYCIN HYDROCHLORIDE 300 MG/1
CAPSULE ORAL
Qty: 14 CAPSULE | Refills: 2 | Status: SHIPPED | OUTPATIENT
Start: 2024-12-23

## 2024-12-23 RX ORDER — CLINDAMYCIN HYDROCHLORIDE 300 MG/1
300 CAPSULE ORAL 2 TIMES DAILY
Qty: 14 CAPSULE | Refills: 0 | Status: SHIPPED | OUTPATIENT
Start: 2024-12-23 | End: 2024-12-23

## 2024-12-23 NOTE — RESULT ENCOUNTER NOTE
Telephone call to patient. Patient informed of Provider notes below.  Patient verbalized understanding

## 2025-04-28 ENCOUNTER — LAB (OUTPATIENT)
Dept: FAMILY MEDICINE CLINIC | Facility: CLINIC | Age: 35
End: 2025-04-28

## 2025-04-28 ENCOUNTER — RESULTS FOLLOW-UP (OUTPATIENT)
Dept: INTERNAL MEDICINE CLINIC | Facility: CLINIC | Age: 35
End: 2025-04-28

## 2025-04-28 ENCOUNTER — OFFICE VISIT (OUTPATIENT)
Dept: INTERNAL MEDICINE CLINIC | Facility: CLINIC | Age: 35
End: 2025-04-28
Payer: COMMERCIAL

## 2025-04-28 VITALS
TEMPERATURE: 97.8 F | HEIGHT: 62 IN | SYSTOLIC BLOOD PRESSURE: 112 MMHG | RESPIRATION RATE: 16 BRPM | OXYGEN SATURATION: 99 % | WEIGHT: 158.6 LBS | BODY MASS INDEX: 29.19 KG/M2 | HEART RATE: 76 BPM | DIASTOLIC BLOOD PRESSURE: 78 MMHG

## 2025-04-28 DIAGNOSIS — R71.8 RBC MICROCYTOSIS: Primary | ICD-10-CM

## 2025-04-28 DIAGNOSIS — R77.1 ELEVATED SERUM GLOBULIN LEVEL: ICD-10-CM

## 2025-04-28 DIAGNOSIS — R42 DIZZINESS: ICD-10-CM

## 2025-04-28 DIAGNOSIS — H93.11 TINNITUS OF RIGHT EAR: ICD-10-CM

## 2025-04-28 DIAGNOSIS — J30.2 SEASONAL ALLERGIES: ICD-10-CM

## 2025-04-28 DIAGNOSIS — R71.8 RBC MICROCYTOSIS: ICD-10-CM

## 2025-04-28 LAB
ALBUMIN SERPL-MCNC: 3.9 G/DL (ref 3.5–5)
ALBUMIN/GLOB SERPL: 1 (ref 1–1.9)
ALP SERPL-CCNC: 59 U/L (ref 35–104)
ALT SERPL-CCNC: 13 U/L (ref 8–45)
ANION GAP SERPL CALC-SCNC: 11 MMOL/L (ref 7–16)
AST SERPL-CCNC: 23 U/L (ref 15–37)
BASOPHILS # BLD: 0.06 K/UL (ref 0–0.2)
BASOPHILS NFR BLD: 0.9 % (ref 0–2)
BILIRUB SERPL-MCNC: 0.6 MG/DL (ref 0–1.2)
BUN SERPL-MCNC: 8 MG/DL (ref 6–23)
CALCIUM SERPL-MCNC: 9.4 MG/DL (ref 8.8–10.2)
CHLORIDE SERPL-SCNC: 103 MMOL/L (ref 98–107)
CO2 SERPL-SCNC: 24 MMOL/L (ref 20–29)
CREAT SERPL-MCNC: 0.77 MG/DL (ref 0.6–1.1)
DIFFERENTIAL METHOD BLD: ABNORMAL
EOSINOPHIL # BLD: 0.83 K/UL (ref 0–0.8)
EOSINOPHIL NFR BLD: 11.8 % (ref 0.5–7.8)
ERYTHROCYTE [DISTWIDTH] IN BLOOD BY AUTOMATED COUNT: 13.4 % (ref 11.9–14.6)
GLOBULIN SER CALC-MCNC: 3.7 G/DL (ref 2.3–3.5)
GLUCOSE SERPL-MCNC: 84 MG/DL (ref 70–99)
HCT VFR BLD AUTO: 42.8 % (ref 35.8–46.3)
HGB BLD-MCNC: 13.2 G/DL (ref 11.7–15.4)
IMM GRANULOCYTES # BLD AUTO: 0.02 K/UL (ref 0–0.5)
IMM GRANULOCYTES NFR BLD AUTO: 0.3 % (ref 0–5)
IRON SATN MFR SERPL: 18 % (ref 20–50)
IRON SERPL-MCNC: 63 UG/DL (ref 35–100)
LYMPHOCYTES # BLD: 2.18 K/UL (ref 0.5–4.6)
LYMPHOCYTES NFR BLD: 31 % (ref 13–44)
MCH RBC QN AUTO: 26.1 PG (ref 26.1–32.9)
MCHC RBC AUTO-ENTMCNC: 30.8 G/DL (ref 31.4–35)
MCV RBC AUTO: 84.6 FL (ref 82–102)
MONOCYTES # BLD: 0.51 K/UL (ref 0.1–1.3)
MONOCYTES NFR BLD: 7.3 % (ref 4–12)
NEUTS SEG # BLD: 3.43 K/UL (ref 1.7–8.2)
NEUTS SEG NFR BLD: 48.7 % (ref 43–78)
NRBC # BLD: 0 K/UL (ref 0–0.2)
PLATELET # BLD AUTO: 367 K/UL (ref 150–450)
PMV BLD AUTO: 9.3 FL (ref 9.4–12.3)
POTASSIUM SERPL-SCNC: 4.1 MMOL/L (ref 3.5–5.1)
PROT SERPL-MCNC: 7.6 G/DL (ref 6.3–8.2)
RBC # BLD AUTO: 5.06 M/UL (ref 4.05–5.2)
SODIUM SERPL-SCNC: 138 MMOL/L (ref 136–145)
TIBC SERPL-MCNC: 344 UG/DL (ref 240–450)
UIBC SERPL-MCNC: 281 UG/DL (ref 112–347)
WBC # BLD AUTO: 7 K/UL (ref 4.3–11.1)

## 2025-04-28 PROCEDURE — 99214 OFFICE O/P EST MOD 30 MIN: CPT | Performed by: INTERNAL MEDICINE

## 2025-04-28 RX ORDER — MONTELUKAST SODIUM 10 MG/1
10 TABLET ORAL DAILY
Qty: 30 TABLET | Refills: 3 | Status: SHIPPED | OUTPATIENT
Start: 2025-04-28

## 2025-04-28 RX ORDER — FLUTICASONE PROPIONATE 50 MCG
2 SPRAY, SUSPENSION (ML) NASAL DAILY
Qty: 48 G | Refills: 5 | Status: SHIPPED | OUTPATIENT
Start: 2025-04-28

## 2025-04-28 RX ORDER — CETIRIZINE HYDROCHLORIDE 10 MG/1
10 TABLET ORAL DAILY
Qty: 90 TABLET | Refills: 3 | Status: SHIPPED | OUTPATIENT
Start: 2025-04-28

## 2025-04-28 SDOH — ECONOMIC STABILITY: FOOD INSECURITY: WITHIN THE PAST 12 MONTHS, YOU WORRIED THAT YOUR FOOD WOULD RUN OUT BEFORE YOU GOT MONEY TO BUY MORE.: NEVER TRUE

## 2025-04-28 SDOH — ECONOMIC STABILITY: INCOME INSECURITY: IN THE LAST 12 MONTHS, WAS THERE A TIME WHEN YOU WERE NOT ABLE TO PAY THE MORTGAGE OR RENT ON TIME?: NO

## 2025-04-28 SDOH — ECONOMIC STABILITY: TRANSPORTATION INSECURITY
IN THE PAST 12 MONTHS, HAS THE LACK OF TRANSPORTATION KEPT YOU FROM MEDICAL APPOINTMENTS OR FROM GETTING MEDICATIONS?: NO

## 2025-04-28 SDOH — ECONOMIC STABILITY: FOOD INSECURITY: WITHIN THE PAST 12 MONTHS, THE FOOD YOU BOUGHT JUST DIDN'T LAST AND YOU DIDN'T HAVE MONEY TO GET MORE.: NEVER TRUE

## 2025-04-28 SDOH — ECONOMIC STABILITY: TRANSPORTATION INSECURITY
IN THE PAST 12 MONTHS, HAS LACK OF TRANSPORTATION KEPT YOU FROM MEETINGS, WORK, OR FROM GETTING THINGS NEEDED FOR DAILY LIVING?: NO

## 2025-04-28 ASSESSMENT — PATIENT HEALTH QUESTIONNAIRE - PHQ9
SUM OF ALL RESPONSES TO PHQ QUESTIONS 1-9: 0
1. LITTLE INTEREST OR PLEASURE IN DOING THINGS: NOT AT ALL
2. FEELING DOWN, DEPRESSED OR HOPELESS: NOT AT ALL
2. FEELING DOWN, DEPRESSED OR HOPELESS: NOT AT ALL
SUM OF ALL RESPONSES TO PHQ QUESTIONS 1-9: 0
SUM OF ALL RESPONSES TO PHQ9 QUESTIONS 1 & 2: 0
1. LITTLE INTEREST OR PLEASURE IN DOING THINGS: NOT AT ALL
SUM OF ALL RESPONSES TO PHQ QUESTIONS 1-9: 0
SUM OF ALL RESPONSES TO PHQ QUESTIONS 1-9: 0

## 2025-04-28 NOTE — PROGRESS NOTES
[facility]  [address] [place] [address]  Phone: [phone]  Fax: [phone]      Problem Based Overview with Integrated Assessment and Plan      History of Present Illness  The patient presents with dizziness, allergies, and elevated globulin levels.    Dizziness  - She reports mild dizziness, particularly when turning to the right, attributed to vertigo.  - Intermittent right ear tinnitus triggered by head movements such as turning to the right or looking over her shoulder, accompanied by dizziness.  - Meclizine provided temporary relief but was discontinued due to fatigue.  - Xyzal or Singulair have not been tried.  - Flonase, borrowed from her daughter, was beneficial.    Elevated Globulin Levels  - Concern about elevated protein levels from last month's lab results, seeking reassurance that levels have normalized.    Allergies  - Prescribed Zyrtec for allergies, which have worsened recently.  - Claritin was less effective.  - No chest pain, shortness of breath, abdominal pain, nausea, vomiting, diarrhea, or constipation.  - Sleep disrupted due to allergies, exacerbated at night when lying down.  - Neti pot and nasal saline used in the past but not recently due to time constraints.    Supplemental information: None       Assessment & Plan      1. Elevated globulin levels.  - Globulin slightly elevated at 3.6, marginally above normal upper limit of 3.5.  - Minor fluctuation not worrisome.  - Discussed normal variability of globulin levels and reassured not a concern.  - No specific treatment required.    2. Dizziness.  - MCV at 81, lower end of normal range, suggesting potential iron deficiency.  - Hemoglobin normal, no immediate concern.  - Dizziness could be due to seasonal allergies or vertigo; semicircular canal dehiscence discussed.  - Referral to ENT specialist at [facility] for further evaluation.    3. Allergies.  - Experiencing allergies, Zyrtec more

## 2025-06-23 ENCOUNTER — OFFICE VISIT (OUTPATIENT)
Dept: AUDIOLOGY | Age: 35
End: 2025-06-23
Payer: COMMERCIAL

## 2025-06-23 DIAGNOSIS — H93.11 RIGHT-SIDED TINNITUS: ICD-10-CM

## 2025-06-23 DIAGNOSIS — R42 DIZZINESS: Primary | ICD-10-CM

## 2025-06-23 PROCEDURE — 92557 COMPREHENSIVE HEARING TEST: CPT | Performed by: AUDIOLOGIST

## 2025-06-23 PROCEDURE — 92567 TYMPANOMETRY: CPT | Performed by: AUDIOLOGIST

## 2025-06-23 NOTE — PROGRESS NOTES
AUDIOLOGY EVALUATION    Shannonnathanael Mascorro had Tympanometry and Audiometry performed today.    The patient reports dizziness and tinnitus in her right ear.     Results as follows:    Tympanometry    Type A -  bilaterally    Audiometry    Test Performed - Comprehensive Audiogram    Type of Loss - Right Ear: normal hearing                          Left Ear: normal hearing    SRT   Measurement Right Ear Left Ear   Value 5 5   Unit dB dB     Discrimination  Measurement Right Ear Left Ear   Value 100% 100%   Unit dB dB     Recommend  Retest as clinically indicated    Ezra Mesa [facility]  Audiologist